# Patient Record
Sex: FEMALE | Race: WHITE | NOT HISPANIC OR LATINO | Employment: STUDENT | URBAN - METROPOLITAN AREA
[De-identification: names, ages, dates, MRNs, and addresses within clinical notes are randomized per-mention and may not be internally consistent; named-entity substitution may affect disease eponyms.]

---

## 2017-09-12 ENCOUNTER — TELEPHONE (OUTPATIENT)
Dept: OPHTHALMOLOGY | Facility: CLINIC | Age: 27
End: 2017-09-12

## 2017-09-12 NOTE — TELEPHONE ENCOUNTER
Called patient to confirm appointment with Dr. Barba on Monday 09/18 @ 7:30.  Asked that she phone back if she has any recent labs or scans that I should obtain.  EMG in [facility] from 1-22-15.  Ruma Noble RN 10:00 AM 09/12/17

## 2017-09-18 ENCOUNTER — OFFICE VISIT (OUTPATIENT)
Dept: OPHTHALMOLOGY | Facility: CLINIC | Age: 27
End: 2017-09-18

## 2017-09-18 VITALS — HEIGHT: 64 IN | BODY MASS INDEX: 23.56 KG/M2 | WEIGHT: 138 LBS

## 2017-09-18 DIAGNOSIS — H02.421 MYOGENIC PTOSIS OF RIGHT EYELID: Primary | ICD-10-CM

## 2017-09-18 ASSESSMENT — SLIT LAMP EXAM - LIDS
COMMENTS: PTOSIS
COMMENTS: PTOSIS

## 2017-09-18 ASSESSMENT — CONF VISUAL FIELD
METHOD: COUNTING FINGERS
OS_NORMAL: 1
OD_NORMAL: 1

## 2017-09-18 ASSESSMENT — VISUAL ACUITY
OS_CC: 20/20
CORRECTION_TYPE: GLASSES
METHOD: SNELLEN - LINEAR
OD_CC: 20/20

## 2017-09-18 ASSESSMENT — TONOMETRY
OD_IOP_MMHG: 17
IOP_METHOD: ICARE
OS_IOP_MMHG: 16

## 2017-09-18 ASSESSMENT — MARGIN REFLEX DISTANCE
OS_MRD1: 3
OD_MRD1: 1.5

## 2017-09-18 NOTE — NURSING NOTE
Chief Complaints and History of Present Illnesses   Patient presents with     Consult For     Ptosis     HPI    Affected eye(s):  Right   Symptoms:     No blurred vision      Frequency:  Constant       Do you have eye pain now?:  No      Comments:  Pt states has had a droopy lid in the right eye- has gotten worse throughout the last 5 years. Pt feels astigmatism has gotten worse and contacts are difficult to wear in the right eye. No pain.     Homar Wagner COT 7:31 AM September 18, 2017

## 2017-09-18 NOTE — MR AVS SNAPSHOT
"              After Visit Summary   9/18/2017    Yuko Arambula    MRN: [id number]           Patient Information     Date Of Birth          1990        Visit Information        Provider Department      9/18/2017 7:30 AM Leo Barba MD [facility] Ophthalmology        Today's Diagnoses     Myogenic ptosis of right eyelid    -  1      Care Instructions    Ptosis (Drooping Eyelids)    Eyelid ptosis (pronounced \"kindra-sis\") is a condition in which the upper eyelid droops or sags. It can affect one or both eyes. Sometimes the eyelid droops enough to obstruct the upper field of vision and/or side vision, requiring correction.??Ptosis Repair is a surgical procedure that can correct drooping eyelid(s). Depending upon the degree and cause, repair involves either resection (shortening) of a muscle in the eyelid or suspension with a muscle of the brow. Typically, the levator muscle (the major muscle responsible for elevating the upper eyelid) is shortened though an incision made along the natural crease of the lid. Excess skin weighing down the eyelid may also be removed.     Congenital Ptosis  Present from birth, the most common cause of congenital ptosis is the improper development of the levator muscle. Children may need tilt their head back or lift their eyelid with a finger to see. They may also develop amblyopia (\"lazy eye\"), strabismus (eyes that are not properly aligned), astigmatism, or blurred vision. Repair for mild to moderate congenital ptosis is generally performed between ages 3 and 5. Severe visual obstruction may require earlier treatment. ??Repair is usually performed in an outpatient surgical facility under general anesthesia so the child will not become anxious or restless during the procedure.     Acquired Ptosis  Most commonly due to age-related weakening of the levator muscle, acquired ptosis may also be caused by injury, trauma, or procedures, such as cataract surgery, which can cause weak " "tendons to stretch. Acquired ptosis may also be the first sign of some diseases, such as myasthenia gravis (a disorder in which the muscles become weak), or Debbi's syndrome (a neurological condition that indicates injury to part of the sympathetic nervous system).? Ptosis Repair is usually performed in an outpatient surgical facility under anesthesia that induces a \"twilight\" state. Sedated consciousness is preferred so that Dr. Barba can accurately adjust the eyelids.     Who Should Perform The Surgery?   When choosing a surgeon to perform ptosis surgery, look for a cosmetic and reconstructive surgeon who specializes in the eyelids, orbit, and tear drain system. Dr. Barba's membership in the American Society of Ophthalmic Plastic and Reconstructive Surgery (ASOPRS) indicates he or she is not only a board certified ophthalmologist who knows the anatomy and structure of the eyelids and orbit, but also has had extensive training in ophthalmic plastic reconstructive and cosmetic surgery.            Follow-ups after your visit        Your next 10 appointments already scheduled     Dec 20, 2017   Procedure with Leo Barba MD   [facility] ([facility])    [address] [place] [address]  [address] [place] [id number] [phone]           Located in the Clinics and Surgery Center at [address] [place] [address].   parking is very convenient and highly recommended.  is a $6 flat rate fee.  Both  and self parkers should enter the main arrival plaza from [facility]; parking attendants will direct you based on your parking preference.            Jan 08, 2018  8:15 AM CST   (Arrive by 8:00 AM)   Post-Op with Leo Barba MD   [facility] ([facility])    [address] [place] [id number] [phone]              Who to contact     Please call your clinic at [phone] " "to:    Ask questions about your health    Make or cancel appointments    Discuss your medicines    Learn about your test results    Speak to your doctor   If you have compliments or concerns about an experience at your clinic, or if you wish to file a complaint, please contact [facility] Patient Relations at [phone] or email us at [email or web address]         Additional Information About Your Visit        [employer] Information     [employer] gives you secure access to your electronic health record. If you see a primary care provider, you can also send messages to your care team and make appointments. If you have questions, please call your primary care clinic.  If you do not have a primary care provider, please call [phone] and they will assist you.      [employer] is an electronic gateway that provides easy, online access to your medical records. With [employer], you can request a clinic appointment, read your test results, renew a prescription or communicate with your care team.     To access your existing account, please contact your [facility] Clinic or call [phone] for assistance.        Care EveryWhere ID     This is your Care EveryWhere ID. This could be used by other organizations to access your [place] medical records  [id number]        Your Vitals Were     Height BMI (Body Mass Index)                1.626 m (5' 4\") 23.69 kg/m2           Blood Pressure from Last 3 Encounters:   01/28/15 117/75   01/19/15 114/72   12/13/14 119/73    Weight from Last 3 Encounters:   09/18/17 62.6 kg (138 lb)   01/28/15 68 kg (150 lb)   01/19/15 71.2 kg (157 lb)              We Performed the Following     External Photos OU (both eyes)     [place] VF Ptosis OD     Clarissa-Operative Worksheet (Plastics)        Primary Care Provider    Physician No Ref-Primary       No address on file        Equal Access to Services     DALE PITT: Dank delatorre " Sojaneen, wadelmerda luqadaha, qanickyta kamelissa florence, michael bui wanderkenyon laaurorarambo mariano. So [facility] [phone].    ATENCIÓN: Si yoseph orellana, tiene a alexis disposición servicios gratuitos de asistencia lingüística. Awilda al [phone].    We comply with applicable federal civil rights laws and [place] laws. We do not discriminate on the basis of race, color, national origin, age, disability sex, sexual orientation or gender identity.            Thank you!     Thank you for choosing [facility] OPHTHALMOLOGY  for your care. Our goal is always to provide you with excellent care. Hearing back from our patients is one way we can continue to improve our services. Please take a few minutes to complete the written survey that you may receive in the mail after your visit with us. Thank you!             Your Updated Medication List - Protect others around you: Learn how to safely use, store and throw away your medicines at www.disposemymeds.org.          This list is accurate as of: 9/18/17  8:25 AM.  Always use your most recent med list.                   Brand Name Dispense Instructions for use Diagnosis    albuterol 108 (90 BASE) MCG/ACT Inhaler    PROAIR HFA/PROVENTIL HFA/VENTOLIN HFA     Inhale 2 puffs into the lungs every 6 hours        loratadine 10 MG tablet    CLARITIN     Take 10 mg by mouth daily        LORAZEPAM PO           mometasone-formoterol 200-5 MCG/ACT oral inhaler    DULERA     Inhale 2 puffs into the lungs 2 times daily        norgestimate-ethinyl estradiol 0.25-35 MG-MCG per tablet    ORTHO-CYCLEN, SPRINTEC     Take 1 tablet by mouth daily

## 2017-09-18 NOTE — PROGRESS NOTES
Oculoplastic Clinic New Patient    Patient: Yuko Arambula MRN# [id number]   YOB: 1990 Age: 27 year old   Date of Visit: Sep 18, 2017    CC: Droopy eyelids obstructing vision.  Chief Complaints and History of Present Illnesses   Patient presents with     Consult For     Ptosis               HPI:     Yuko Arambula is a 27 year old female who has noted gradual onset of a droopy eyelid on the right side. The droopy eyelid is bothersome to her vision on the right.  She has developed a new astigmatism as well, which she attributes to the drooping. The patient denies double vision, variability of the eyelid position, or dry eye symptoms. She underwent a single fiber EMG, which was negative for myasthenia.  No prior trauma or other injury.  Has slowly gotten worse over years.    EXAM:     MRD1: 1.5 right, 3left  Excellent response to phenylephrine without significant Herring  Right eye dominant    VISUAL FIELD:  Right eye untaped:15 degrees Right eye taped:55 degrees    Assessment & Plan     Yuko Arambula is a 27 year old female with the following diagnoses:   1. Myogenic ptosis of right eyelid         Right ptosis repair MMCR 9.mm    ANTICOAGULATION: none        PHOTOS DEMONSTRATE:  Ptosis on the right    Kelsi Harvey MD  Ophthalmic Plastic and Reconstructive Surgery Fellow    Attending Physician Attestation:  I have seen and examined this patient .  I have confirmed and edited as necessary the chief complaint(s), history of present illness, review of systems, relevant history, and examination findings as documented by others.  I have personally reviewed the relevant tests, images, and reports as documented above.  I have confirmed and edited as necessary the assessment and plan and agree with this note.    - Leo Barba MD 8:14 AM 9/18/2017    Photographs were obtained to document the findings recorded under exam. These will be stored for future reference and comparison. The plan is  documented under assessment and plan above.   - Leo Barba MD 8:14 AM 9/18/2017    Today with Yuko Arambula, I reviewed the indications, risks, benefits, and alternatives of the proposed surgical procedure including, but not limited to, failure obtain the desired result  and need for additional surgery, bleeding, infection, loss of vision, loss of the eye, and the remote possibility of permanent damage to any organ system or death with the use of anesthesia.  I provided multiple opportunities for the questions, answered all questions to the best of my ability, and confirmed that my answers and my discussion were understood.   - Leo Barba MD 8:15 AM 9/18/2017

## 2017-09-18 NOTE — PATIENT INSTRUCTIONS
"Ptosis (Drooping Eyelids)    Eyelid ptosis (pronounced \"kindra-sis\") is a condition in which the upper eyelid droops or sags. It can affect one or both eyes. Sometimes the eyelid droops enough to obstruct the upper field of vision and/or side vision, requiring correction.??Ptosis Repair is a surgical procedure that can correct drooping eyelid(s). Depending upon the degree and cause, repair involves either resection (shortening) of a muscle in the eyelid or suspension with a muscle of the brow. Typically, the levator muscle (the major muscle responsible for elevating the upper eyelid) is shortened though an incision made along the natural crease of the lid. Excess skin weighing down the eyelid may also be removed.     Congenital Ptosis  Present from birth, the most common cause of congenital ptosis is the improper development of the levator muscle. Children may need tilt their head back or lift their eyelid with a finger to see. They may also develop amblyopia (\"lazy eye\"), strabismus (eyes that are not properly aligned), astigmatism, or blurred vision. Repair for mild to moderate congenital ptosis is generally performed between ages 3 and 5. Severe visual obstruction may require earlier treatment. ??Repair is usually performed in an outpatient surgical facility under general anesthesia so the child will not become anxious or restless during the procedure.     Acquired Ptosis  Most commonly due to age-related weakening of the levator muscle, acquired ptosis may also be caused by injury, trauma, or procedures, such as cataract surgery, which can cause weak tendons to stretch. Acquired ptosis may also be the first sign of some diseases, such as myasthenia gravis (a disorder in which the muscles become weak), or Debbi's syndrome (a neurological condition that indicates injury to part of the sympathetic nervous system).? Ptosis Repair is usually performed in an outpatient surgical facility under anesthesia that induces a " "\"twilight\" state. Sedated consciousness is preferred so that Dr. Barba can accurately adjust the eyelids.     Who Should Perform The Surgery?   When choosing a surgeon to perform ptosis surgery, look for a cosmetic and reconstructive surgeon who specializes in the eyelids, orbit, and tear drain system. Dr. Barba's membership in the American Society of Ophthalmic Plastic and Reconstructive Surgery (ASOPRS) indicates he or she is not only a board certified ophthalmologist who knows the anatomy and structure of the eyelids and orbit, but also has had extensive training in ophthalmic plastic reconstructive and cosmetic surgery.    "

## 2017-09-18 NOTE — LETTER
2017         RE:  :  MRN: Yuko Arambula  1990  [id number]     Dear Cody,    Thank you for asking me to see your patient, Yuko Arambula, for an oculoplastic   consultation.  My assessment and plan are below.  For further details, please see my attached clinic note.           HPI:     Yuko Arambula is a 27 year old female who has noted gradual onset of a droopy eyelid on the right side. The droopy eyelid is bothersome to her vision on the right.  She has developed a new astigmatism as well, which she attributes to the drooping. The patient denies double vision, variability of the eyelid position, or dry eye symptoms. She underwent a single fiber EMG, which was negative for myasthenia.  No prior trauma or other injury.  Has slowly gotten worse over years.    EXAM:   MRD1: 1.5 right, 3left  Excellent response to phenylephrine without significant Herring  Right eye dominant    VISUAL FIELD:  Right eye untaped:15 degrees Right eye taped:55 degrees    Assessment & Plan     Yuko Arambula is a 27 year old female with the following diagnoses:   1. Myogenic ptosis of right eyelid     -likely contact lens related    Right ptosis repair by Raines's muscle conjunctival resection       Again, thank you for allowing me to participate in the care of your patient.      Sincerely,    Leo Barba MD  Department of Ophthalmology and Visual Neurosciences  [facility]    CC: [facility]  [address] [address]  VIA Facsimile: [phone]     Cody Sifuentes MD  [address] [facility] [address] [facility] [facility] [id number]  [facility] [address]  VIA In Basket

## 2017-12-19 ENCOUNTER — ANESTHESIA EVENT (OUTPATIENT)
Dept: SURGERY | Facility: AMBULATORY SURGERY CENTER | Age: 27
End: 2017-12-19

## 2017-12-20 ENCOUNTER — ANESTHESIA (OUTPATIENT)
Dept: SURGERY | Facility: AMBULATORY SURGERY CENTER | Age: 27
End: 2017-12-20

## 2017-12-20 ENCOUNTER — HOSPITAL ENCOUNTER (OUTPATIENT)
Facility: AMBULATORY SURGERY CENTER | Age: 27
End: 2017-12-20
Attending: OPHTHALMOLOGY
Payer: COMMERCIAL

## 2017-12-20 ENCOUNTER — SURGERY (OUTPATIENT)
Age: 27
End: 2017-12-20

## 2017-12-20 VITALS
RESPIRATION RATE: 11 BRPM | OXYGEN SATURATION: 96 % | HEIGHT: 64 IN | SYSTOLIC BLOOD PRESSURE: 107 MMHG | DIASTOLIC BLOOD PRESSURE: 62 MMHG | BODY MASS INDEX: 22.2 KG/M2 | TEMPERATURE: 98 F | HEART RATE: 77 BPM | WEIGHT: 130 LBS

## 2017-12-20 DIAGNOSIS — Z98.890 POSTOPERATIVE EYE STATE: Primary | ICD-10-CM

## 2017-12-20 LAB
HCG UR QL: NEGATIVE
INTERNAL QC OK POCT: YES

## 2017-12-20 RX ORDER — LIDOCAINE 40 MG/G
CREAM TOPICAL
Status: DISCONTINUED | OUTPATIENT
Start: 2017-12-20 | End: 2017-12-20 | Stop reason: HOSPADM

## 2017-12-20 RX ORDER — MEPERIDINE HYDROCHLORIDE 25 MG/ML
12.5 INJECTION INTRAMUSCULAR; INTRAVENOUS; SUBCUTANEOUS
Status: DISCONTINUED | OUTPATIENT
Start: 2017-12-20 | End: 2017-12-21 | Stop reason: HOSPADM

## 2017-12-20 RX ORDER — ACETAMINOPHEN 325 MG/1
975 TABLET ORAL ONCE
Status: COMPLETED | OUTPATIENT
Start: 2017-12-20 | End: 2017-12-20

## 2017-12-20 RX ORDER — PROPOFOL 10 MG/ML
INJECTION, EMULSION INTRAVENOUS CONTINUOUS PRN
Status: DISCONTINUED | OUTPATIENT
Start: 2017-12-20 | End: 2017-12-20

## 2017-12-20 RX ORDER — ONDANSETRON 4 MG/1
4 TABLET, ORALLY DISINTEGRATING ORAL EVERY 30 MIN PRN
Status: DISCONTINUED | OUTPATIENT
Start: 2017-12-20 | End: 2017-12-21 | Stop reason: HOSPADM

## 2017-12-20 RX ORDER — TETRACAINE HYDROCHLORIDE 5 MG/ML
SOLUTION OPHTHALMIC PRN
Status: DISCONTINUED | OUTPATIENT
Start: 2017-12-20 | End: 2017-12-20 | Stop reason: HOSPADM

## 2017-12-20 RX ORDER — LIDOCAINE HYDROCHLORIDE AND EPINEPHRINE 10; 10 MG/ML; UG/ML
INJECTION, SOLUTION INFILTRATION; PERINEURAL PRN
Status: DISCONTINUED | OUTPATIENT
Start: 2017-12-20 | End: 2017-12-20 | Stop reason: HOSPADM

## 2017-12-20 RX ORDER — SODIUM CHLORIDE, SODIUM LACTATE, POTASSIUM CHLORIDE, CALCIUM CHLORIDE 600; 310; 30; 20 MG/100ML; MG/100ML; MG/100ML; MG/100ML
INJECTION, SOLUTION INTRAVENOUS CONTINUOUS
Status: DISCONTINUED | OUTPATIENT
Start: 2017-12-20 | End: 2017-12-21 | Stop reason: HOSPADM

## 2017-12-20 RX ORDER — NALOXONE HYDROCHLORIDE 0.4 MG/ML
.1-.4 INJECTION, SOLUTION INTRAMUSCULAR; INTRAVENOUS; SUBCUTANEOUS
Status: DISCONTINUED | OUTPATIENT
Start: 2017-12-20 | End: 2017-12-21 | Stop reason: HOSPADM

## 2017-12-20 RX ORDER — NEOMYCIN SULFATE, POLYMYXIN B SULFATE AND DEXAMETHASONE 3.5; 10000; 1 MG/ML; [USP'U]/ML; MG/ML
1 SUSPENSION/ DROPS OPHTHALMIC 4 TIMES DAILY
Qty: 1 BOTTLE | Refills: 0 | Status: SHIPPED | OUTPATIENT
Start: 2017-12-20 | End: 2019-07-19

## 2017-12-20 RX ORDER — FENTANYL CITRATE 50 UG/ML
25-50 INJECTION, SOLUTION INTRAMUSCULAR; INTRAVENOUS
Status: DISCONTINUED | OUTPATIENT
Start: 2017-12-20 | End: 2017-12-20 | Stop reason: HOSPADM

## 2017-12-20 RX ORDER — ERYTHROMYCIN 5 MG/G
OINTMENT OPHTHALMIC PRN
Status: DISCONTINUED | OUTPATIENT
Start: 2017-12-20 | End: 2017-12-20 | Stop reason: HOSPADM

## 2017-12-20 RX ORDER — FENTANYL CITRATE 50 UG/ML
INJECTION, SOLUTION INTRAMUSCULAR; INTRAVENOUS PRN
Status: DISCONTINUED | OUTPATIENT
Start: 2017-12-20 | End: 2017-12-20

## 2017-12-20 RX ORDER — GABAPENTIN 300 MG/1
300 CAPSULE ORAL ONCE
Status: COMPLETED | OUTPATIENT
Start: 2017-12-20 | End: 2017-12-20

## 2017-12-20 RX ORDER — SODIUM CHLORIDE, SODIUM LACTATE, POTASSIUM CHLORIDE, CALCIUM CHLORIDE 600; 310; 30; 20 MG/100ML; MG/100ML; MG/100ML; MG/100ML
INJECTION, SOLUTION INTRAVENOUS CONTINUOUS
Status: DISCONTINUED | OUTPATIENT
Start: 2017-12-20 | End: 2017-12-20 | Stop reason: HOSPADM

## 2017-12-20 RX ORDER — ONDANSETRON 2 MG/ML
4 INJECTION INTRAMUSCULAR; INTRAVENOUS EVERY 30 MIN PRN
Status: DISCONTINUED | OUTPATIENT
Start: 2017-12-20 | End: 2017-12-21 | Stop reason: HOSPADM

## 2017-12-20 RX ORDER — HYDROCODONE BITARTRATE AND ACETAMINOPHEN 5; 325 MG/1; MG/1
1 TABLET ORAL EVERY 6 HOURS PRN
Qty: 10 TABLET | Refills: 0 | Status: SHIPPED | OUTPATIENT
Start: 2017-12-20 | End: 2019-07-19

## 2017-12-20 RX ORDER — PROPOFOL 10 MG/ML
INJECTION, EMULSION INTRAVENOUS PRN
Status: DISCONTINUED | OUTPATIENT
Start: 2017-12-20 | End: 2017-12-20

## 2017-12-20 RX ORDER — ONDANSETRON 2 MG/ML
INJECTION INTRAMUSCULAR; INTRAVENOUS PRN
Status: DISCONTINUED | OUTPATIENT
Start: 2017-12-20 | End: 2017-12-20

## 2017-12-20 RX ADMIN — PROPOFOL 50 MG: 10 INJECTION, EMULSION INTRAVENOUS at 09:57

## 2017-12-20 RX ADMIN — ERYTHROMYCIN 1 INCH: 5 OINTMENT OPHTHALMIC at 10:06

## 2017-12-20 RX ADMIN — SODIUM CHLORIDE, SODIUM LACTATE, POTASSIUM CHLORIDE, CALCIUM CHLORIDE: 600; 310; 30; 20 INJECTION, SOLUTION INTRAVENOUS at 08:30

## 2017-12-20 RX ADMIN — GABAPENTIN 300 MG: 300 CAPSULE ORAL at 08:30

## 2017-12-20 RX ADMIN — PROPOFOL 50 MG: 10 INJECTION, EMULSION INTRAVENOUS at 09:53

## 2017-12-20 RX ADMIN — FENTANYL CITRATE 100 MCG: 50 INJECTION, SOLUTION INTRAMUSCULAR; INTRAVENOUS at 09:50

## 2017-12-20 RX ADMIN — PROPOFOL 150 MCG/KG/MIN: 10 INJECTION, EMULSION INTRAVENOUS at 09:53

## 2017-12-20 RX ADMIN — TETRACAINE HYDROCHLORIDE 2 DROP: 5 SOLUTION OPHTHALMIC at 09:52

## 2017-12-20 RX ADMIN — PROPOFOL 20 MG: 10 INJECTION, EMULSION INTRAVENOUS at 09:55

## 2017-12-20 RX ADMIN — ONDANSETRON 4 MG: 2 INJECTION INTRAMUSCULAR; INTRAVENOUS at 09:55

## 2017-12-20 RX ADMIN — LIDOCAINE HYDROCHLORIDE AND EPINEPHRINE 3 ML: 10; 10 INJECTION, SOLUTION INFILTRATION; PERINEURAL at 10:05

## 2017-12-20 RX ADMIN — ACETAMINOPHEN 975 MG: 325 TABLET ORAL at 08:29

## 2017-12-20 NOTE — DISCHARGE INSTRUCTIONS
Post-operative Instructions    Ophthalmic Plastic and Reconstructive Surgery  Leo Barba M.D.  Rosendo Conklin M.D.  Kelsi Harvey M.D.    All instructions apply to the operated eye(s) or eyelid(s)      What to expect after surgery:    Thre will be some swelling, bruising, and likely a black eye (even into the lower eyelids and cheeks). Also expect crusting and discharge from the eye and/or incisions.     A small amount of surface bleeding is normal for the first 48 hours after surgery.    You may notice some bloody tears for the first few days after surgery. This is normal.    Your eye(s) and eyelid(s) may be painful and tender. This is normal after surgery. Use the pain medication as prescribed. If your pain does not improve despite the medication, contact the office.    Wound care and personal care:    If a patch or bandage has been placed, please leave this in place until seen in clinic. Prevent the bandage from getting wet.     Apply ice compresses 15 minutes on 15 minutes off while awake for the first 2 days after surgery, then switch to warm compresses 4 times a day until seen by your physician.     For warm packs you can place a cup of dry uncooked rice in a clean cotton sock. Place sock in microwave 30 seconds to one minute. Next place the warm sock into a plastic bag and wrap the bag with clean warm wet washcloth and place over operated eye.      You may shower or wash your hair the day after surgery. Do not bathe or go swimming for 1 week to prevent contamination of your wounds.    Do not apply make-up to the eyes or eyelids for 2 weeks after surgery.      Activity restrictions and driving:    Avoid heavy lifting, bending, exercise or strenuous activity for 1 week after surgery.    You may resume other activities and return to work as tolerated.    You may not resume driving until have you stopped using narcotic pain medications(such as Norco, Percocet, Tylenol #3).    Medications:    Restart  all your regular home medications and eye drops today. If you take Plavix or Aspirin on a regular basis, wait for 3 days after your surgery before restarting these in order to decrease the risk of bleeding complications.    Avoid aspirin and aspirin-like medications (Motrin, Aleve, Ibuprofen, Tasha-White Swan etc) for 5 days to reduce the risk of bleeding. You may take Tylenol (acetaminophen) for pain.    In addition to your home medications, take the following post-operative medications as prescribed by your physician:    Apply antibiotic ointment (erythromycin) to all sutures three times a day, and into the operated eye(s) at night.     Instill eye drops (Maxitrol) four times a day until the bottle finished.     Take 1 to 2 pain pills (norco or tylenol 3 as prescribed) as needed for pain up to every 4 hours.    The pain pills may make you drowsy. You must not drive a car, operate heavy machinery or drink alcohol while taking them.    The pain pills may cause constipation and nausea. Take them with some food to prevent a stomach upset. If you continue to experience nausea, call your physician.      WARNING: All the prescription pain medications listed above contain Tylenol (acetaminophen). You must not take more than 4,000 mg of acetaminophen per 24-hour period. This is equivalent to 6 tablets of Darvocet, 8 tablets of Vicodin, or 12 tablets of Norco, Percocet or Tylenol #3. If you take other over-the-counter medications containing acetaminophen, you must take the amount of acetaminophen into account and reduce the number of prescribed pain pills accordingly.    Contact information and follow-up:    Return to the Eye Clinic for a follow-up appointment with your physician as  scheduled. If no appointment has been scheduled, call [phone] for an  appointment with Dr. Barba within 1 to 2 weeks from your date of surgery.      For severe pain, bleeding, or loss of vision, call the Eye Clinic at [phone].    After  hours or on weekends and holidays, call [phone] and ask to speak with the ophthalmologist on call.

## 2017-12-20 NOTE — ANESTHESIA PREPROCEDURE EVALUATION
Anesthesia Evaluation     . Pt has had prior anesthetic.     No history of anesthetic complications          ROS/MED HX    ENT/Pulmonary:       Neurologic:       Cardiovascular:  - neg cardiovascular ROS       METS/Exercise Tolerance:     Hematologic:         Musculoskeletal:         GI/Hepatic:  - neg GI/hepatic ROS       Renal/Genitourinary:         Endo:         Psychiatric:     (+) psychiatric history depression and anxiety      Infectious Disease:         Malignancy:         Other:                     Physical Exam  Normal systems: dental    Airway   Mallampati: II  TM distance: >3 FB    Dental     Cardiovascular   Rhythm and rate: regular      Pulmonary    breath sounds clear to auscultation                    Anesthesia Plan      History & Physical Review  History and physical reviewed and following examination; no interval change.    ASA Status:  2 .    NPO Status:  > 8 hours    Plan for MAC with Intravenous induction. Maintenance will be TIVA.    PONV prophylaxis:  Ondansetron (or other 5HT-3)       Postoperative Care  Postoperative pain management:  Oral pain medications and Multi-modal analgesia.      Consents  Anesthetic plan, risks, benefits and alternatives discussed with:  Patient..                          .

## 2017-12-20 NOTE — IP AVS SNAPSHOT
MRN:[id number]                      After Visit Summary   12/20/2017    Yuko Arambula    MRN: [id number]           Thank you!     Thank you for choosing [facility] for your care. Our goal is always to provide you with excellent care. Hearing back from our patients is one way we can continue to improve our services. Please take a few minutes to complete the written survey that you may receive in the mail after you visit with us. Thank you!        Patient Information     Date Of Birth          1990        About your hospital stay     You were admitted on:  December 20, 2017 You last received care in the:  [facility]    You were discharged on:  December 20, 2017       Who to Call     For medical emergencies, please call 911.  For non-urgent questions about your medical care, please call your primary care provider or clinic, None  For questions related to your surgery, please call your surgery clinic        Attending Provider     Provider Leo Pabon MD Ophthalmology       Primary Care Provider Fax #    Physician No Ref-Primary [phone]      After Care Instructions     Discharge Medication Instructions       Do NOT take aspirin or medications containing NSAIDS for 72 hours after procedure.            Ice to affected area       Apply cold pack for 15 minutes on, 15 minutes off, for 48 hours while awake.                  Your next 10 appointments already scheduled     Jan 08, 2018  8:15 AM CST   (Arrive by 8:00 AM)   Post-Op with Leo Barba MD   [facility] ([facility] [place])    [address] [place] [address] [phone]              Further instructions from your care team       Post-operative Instructions    Ophthalmic Plastic and Reconstructive Surgery  Leo Barba M.D.  Rosendo Conklin M.D.  Kelsi Harvey M.D.    All instructions apply to the operated eye(s) or  eyelid(s)      What to expect after surgery:    Thre will be some swelling, bruising, and likely a black eye (even into the lower eyelids and cheeks). Also expect crusting and discharge from the eye and/or incisions.     A small amount of surface bleeding is normal for the first 48 hours after surgery.    You may notice some bloody tears for the first few days after surgery. This is normal.    Your eye(s) and eyelid(s) may be painful and tender. This is normal after surgery. Use the pain medication as prescribed. If your pain does not improve despite the medication, contact the office.    Wound care and personal care:    If a patch or bandage has been placed, please leave this in place until seen in clinic. Prevent the bandage from getting wet.     Apply ice compresses 15 minutes on 15 minutes off while awake for the first 2 days after surgery, then switch to warm compresses 4 times a day until seen by your physician.     For warm packs you can place a cup of dry uncooked rice in a clean cotton sock. Place sock in microwave 30 seconds to one minute. Next place the warm sock into a plastic bag and wrap the bag with clean warm wet washcloth and place over operated eye.      You may shower or wash your hair the day after surgery. Do not bathe or go swimming for 1 week to prevent contamination of your wounds.    Do not apply make-up to the eyes or eyelids for 2 weeks after surgery.      Activity restrictions and driving:    Avoid heavy lifting, bending, exercise or strenuous activity for 1 week after surgery.    You may resume other activities and return to work as tolerated.    You may not resume driving until have you stopped using narcotic pain medications(such as Norco, Percocet, Tylenol #3).    Medications:    Restart all your regular home medications and eye drops today. If you take Plavix or Aspirin on a regular basis, wait for 3 days after your surgery before restarting these in order to decrease the risk of  bleeding complications.    Avoid aspirin and aspirin-like medications (Motrin, Aleve, Ibuprofen, T[place] etc) for 5 days to reduce the risk of bleeding. You may take Tylenol (acetaminophen) for pain.    In addition to your home medications, take the following post-operative medications as prescribed by your physician:    Apply antibiotic ointment (erythromycin) to all sutures three times a day, and into the operated eye(s) at night.     Instill eye drops (Maxitrol) four times a day until the bottle finished.     Take 1 to 2 pain pills (norco or tylenol 3 as prescribed) as needed for pain up to every 4 hours.    The pain pills may make you drowsy. You must not drive a car, operate heavy machinery or drink alcohol while taking them.    The pain pills may cause constipation and nausea. Take them with some food to prevent a stomach upset. If you continue to experience nausea, call your physician.      WARNING: All the prescription pain medications listed above contain Tylenol (acetaminophen). You must not take more than 4,000 mg of acetaminophen per 24-hour period. This is equivalent to 6 tablets of Darvocet, 8 tablets of Vicodin, or 12 tablets of Norco, Percocet or Tylenol #3. If you take other over-the-counter medications containing acetaminophen, you must take the amount of acetaminophen into account and reduce the number of prescribed pain pills accordingly.    Contact information and follow-up:    Return to the Eye Clinic for a follow-up appointment with your physician as  scheduled. If no appointment has been scheduled, call [phone] for an  appointment with Dr. Barba within 1 to 2 weeks from your date of surgery.      For severe pain, bleeding, or loss of vision, call the Eye Clinic at [phone].    After hours or on weekends and holidays, call [phone] and ask to speak with the ophthalmologist on call.      Pending Results     No orders found from 12/18/2017 to 12/21/2017.            Admission  "Information     Date & Time Provider Department Dept. Phone    12/20/2017 Leo Barba MD [facility] [phone]      Your Vitals Were     Blood Pressure Pulse Temperature Respirations Height Weight    111/69 92 98  F (36.7  C) (Oral) 10 1.626 m (5' 4\") 59 kg (130 lb)    Last Period Pulse Oximetry BMI (Body Mass Index)             11/19/2017 95% 22.31 kg/m2         Activity [employer] Information     [employer] gives you secure access to your electronic health record. If you see a primary care provider, you can also send messages to your care team and make appointments. If you have questions, please call your primary care clinic.  If you do not have a primary care provider, please call [phone] and they will assist you.      [employer] is an electronic gateway that provides easy, online access to your medical records. With [employer], you can request a clinic appointment, read your test results, renew a prescription or communicate with your care team.     To access your existing account, please contact your [facility] or call [phone] for assistance.        Care EveryWhere ID     This is your Care EveryWhere ID. This could be used by other organizations to access your [facility] medical records  [id number]        Equal Access to Services     DALE HARRISON : Hadii salud zhango Sojaneen, waaxda luqadaha, qaybta kaalmada adeegyada, michael quintana. So [facility] [phone].    ATENCIÓN: Si habla español, tiene a alexis disposición servicios gratuitos de asistencia lingüística. Llluis al [phone].    We comply with applicable federal civil rights laws and [place] laws. We do not discriminate on the basis of race, color, national origin, age, disability, sex, sexual orientation, or gender identity.               Review of your medicines      START taking        Dose / Directions    HYDROcodone-acetaminophen 5-325 MG per tablet   Commonly known as: "  NORCO   Used for:  Postoperative eye state        Dose:  1 tablet   Take 1 tablet by mouth every 6 hours as needed for pain Maximum of 4000 mg of acetaminophen in 24 hours.   Quantity:  10 tablet   Refills:  0       neomycin-polymyxin-dexamethasone 3.5-69483-4.1 Susp ophthalmic susp   Commonly known as:  MAXITROL   Used for:  Postoperative eye state        Dose:  1 drop   Place 1 drop into the right eye 4 times daily   Quantity:  1 Bottle   Refills:  0         CONTINUE these medicines which have NOT CHANGED        Dose / Directions    albuterol 108 (90 BASE) MCG/ACT Inhaler   Commonly known as:  PROAIR HFA/PROVENTIL HFA/VENTOLIN HFA        Dose:  2 puff   Inhale 2 puffs into the lungs every 6 hours   Refills:  0       loratadine 10 MG tablet   Commonly known as:  CLARITIN        Dose:  10 mg   Take 10 mg by mouth daily   Refills:  0       norgestimate-ethinyl estradiol 0.25-35 MG-MCG per tablet   Commonly known as:  ORTHO-CYCLEN, SPRINTEC        Dose:  1 tablet   Take 1 tablet by mouth daily   Refills:  0       XANAX PO        Dose:  0.5 mg   Take 0.5 mg by mouth once as needed for anxiety   Refills:  0            Where to get your medicines      These medications were sent to [address] [address]    Hours:  TRANSPLANT PHONE NUMBER [phone] Phone:  [phone]     neomycin-polymyxin-dexamethasone 3.5-00381-1.1 Susp ophthalmic susp         Some of these will need a paper prescription and others can be bought over the counter. Ask your nurse if you have questions.     Bring a paper prescription for each of these medications     HYDROcodone-acetaminophen 5-325 MG per tablet                Protect others around you: Learn how to safely use, store and throw away your medicines at www.disposemymeds.org.             Medication List: This is a list of all your medications and when to take them. Check marks  below indicate your daily home schedule. Keep this list as a reference.      Medications           Morning Afternoon Evening Bedtime As Needed    albuterol 108 (90 BASE) MCG/ACT Inhaler   Commonly known as:  PROAIR HFA/PROVENTIL HFA/VENTOLIN HFA   Inhale 2 puffs into the lungs every 6 hours                                HYDROcodone-acetaminophen 5-325 MG per tablet   Commonly known as:  NORCO   Take 1 tablet by mouth every 6 hours as needed for pain Maximum of 4000 mg of acetaminophen in 24 hours.                                loratadine 10 MG tablet   Commonly known as:  CLARITIN   Take 10 mg by mouth daily                                neomycin-polymyxin-dexamethasone 3.5-29541-1.1 Susp ophthalmic susp   Commonly known as:  MAXITROL   Place 1 drop into the right eye 4 times daily                                norgestimate-ethinyl estradiol 0.25-35 MG-MCG per tablet   Commonly known as:  ORTHO-CYCLEN, SPRINTEC   Take 1 tablet by mouth daily                                XANAX PO   Take 0.5 mg by mouth once as needed for anxiety

## 2017-12-20 NOTE — ANESTHESIA CARE TRANSFER NOTE
Patient: Yuko Arambula    Procedure(s):  Right Upper Lid Ptosis Repair - Wound Class: I-Clean    Diagnosis: Ptosis  Diagnosis Additional Information: No value filed.    Anesthesia Type:   MAC     Note:  Airway :Room Air  Patient transferred to:Phase II  Handoff Report: Identifed the Patient, Identified the Reponsible Provider, Reviewed the pertinent medical history, Discussed the surgical course, Reviewed Intra-OP anesthesia mangement and issues during anesthesia, Set expectations for post-procedure period and Allowed opportunity for questions and acknowledgement of understanding      Vitals: (Last set prior to Anesthesia Care Transfer)    CRNA VITALS  12/20/2017 0941 - 12/20/2017 1013      12/20/2017             Pulse: 95    Ht Rate: 95    SpO2: 98 %    Resp Rate (set): 10                Electronically Signed By: TIMMY Pina CRNA  December 20, 2017  10:13 AM

## 2017-12-20 NOTE — IP AVS SNAPSHOT
[facility]    [address] [place] [address]    Phone:  [phone]    Fax:  [phone]                                       After Visit Summary   12/20/2017    Yuko Arambula    MRN: [id number]           After Visit Summary Signature Page     I have received my discharge instructions, and my questions have been answered. I have discussed any challenges I see with this plan with the nurse or doctor.    ..........................................................................................................................................  Patient/Patient Representative Signature      ..........................................................................................................................................  Patient Representative Print Name and Relationship to Patient    ..................................................               ................................................  Date                                            Time    ..........................................................................................................................................  Reviewed by Signature/Title    ...................................................              ..............................................  Date                                                            Time

## 2017-12-20 NOTE — ANESTHESIA POSTPROCEDURE EVALUATION
Patient: Yuko Arambula    Procedure(s):  Right Upper Lid Ptosis Repair - Wound Class: I-Clean    Diagnosis:Ptosis  Diagnosis Additional Information: No value filed.    Anesthesia Type:  MAC    Note:  Anesthesia Post Evaluation    Patient location during evaluation: PACU  Patient participation: Able to fully participate in evaluation  Level of consciousness: awake and alert  Pain management: adequate  Airway patency: patent  Cardiovascular status: hemodynamically stable  Respiratory status: nonlabored ventilation, spontaneous ventilation and room air  Hydration status: euvolemic  PONV: none     Anesthetic complications: None          Last vitals:  Vitals:    12/20/17 1018 12/20/17 1030 12/20/17 1040   BP: 111/69 106/68 107/62   Pulse: 92 86 77   Resp: 10 12 11   Temp: 36.7  C (98  F)     SpO2: 95% 95% 96%         Electronically Signed By: Paolo Delvalle MD  December 20, 2017  12:57 PM

## 2017-12-21 ENCOUNTER — TELEPHONE (OUTPATIENT)
Dept: OPHTHALMOLOGY | Facility: CLINIC | Age: 27
End: 2017-12-21

## 2017-12-21 NOTE — TELEPHONE ENCOUNTER
POD1  A telephone call was made to Yuko Arambula to make sure the post operative course has been uneventful and that all questions were answered. There was no answer and a telephone message was left.    Kelsi Harvey

## 2017-12-24 ENCOUNTER — HEALTH MAINTENANCE LETTER (OUTPATIENT)
Age: 27
End: 2017-12-24

## 2018-01-08 ENCOUNTER — OFFICE VISIT (OUTPATIENT)
Dept: OPHTHALMOLOGY | Facility: CLINIC | Age: 28
End: 2018-01-08
Payer: COMMERCIAL

## 2018-01-08 DIAGNOSIS — Z98.890 POSTOPERATIVE EYE STATE: Primary | ICD-10-CM

## 2018-01-08 DIAGNOSIS — H02.421 MYOGENIC PTOSIS OF RIGHT EYELID: ICD-10-CM

## 2018-01-08 RX ORDER — LAMOTRIGINE 100 MG/1
100 TABLET ORAL DAILY
COMMUNITY
End: 2019-07-19

## 2018-01-08 ASSESSMENT — VISUAL ACUITY
OD_CC+: +2
OD_CC: 20/20
OS_CC+: -
OS_CC: 20/20
METHOD: SNELLEN - LINEAR
CORRECTION_TYPE: GLASSES

## 2018-01-08 ASSESSMENT — SLIT LAMP EXAM - LIDS
COMMENTS: NORMAL
COMMENTS: LID IN GOOD POSITION

## 2018-01-08 ASSESSMENT — TONOMETRY
IOP_METHOD: ICARE
OD_IOP_MMHG: 18
OS_IOP_MMHG: 14

## 2018-01-08 NOTE — MR AVS SNAPSHOT
After Visit Summary   1/8/2018    Yuko Arambula    MRN: [id number]           Patient Information     Date Of Birth          1990        Visit Information        Provider Department      1/8/2018 1:00 PM Leo Barba MD [facility]        Today's Diagnoses     Postoperative eye state    -  1    Myogenic ptosis of right eyelid           Follow-ups after your visit        Follow-up notes from your care team     Return in about 2 months (around 3/8/2018).      Your next 10 appointments already scheduled     Mar 12, 2018  1:15 PM CDT   (Arrive by 1:00 PM)   Post-Op with Leo Barba MD   [facility] ([facility] [place])    [address]  [address] [facility] [address] [phone]              Who to contact     Please call your clinic at [phone] to:    Ask questions about your health    Make or cancel appointments    Discuss your medicines    Learn about your test results    Speak to your doctor   If you have compliments or concerns about an experience at your clinic, or if you wish to file a complaint, please contact [facility] Patient Relations at [phone] or email us at [email or web address]         Additional Information About Your Visit        MyChart Information     [employer] gives you secure access to your electronic health record. If you see a primary care provider, you can also send messages to your care team and make appointments. If you have questions, please call your primary care clinic.  If you do not have a primary care provider, please call [phone] and they will assist you.      [employer] is an electronic gateway that provides easy, online access to your medical records. With [employer], you can request a clinic appointment, read your test results, renew a prescription or communicate with your care team.     To access your existing account, please contact your [facility]  [place] [facility] or call [phone] for assistance.        Care EveryWhere ID     This is your Care EveryWhere ID. This could be used by other organizations to access your [place] medical records  [id number]        Your Vitals Were     Last Period                   11/19/2017            Blood Pressure from Last 3 Encounters:   12/20/17 107/62   01/28/15 117/75   01/19/15 114/72    Weight from Last 3 Encounters:   12/20/17 59 kg (130 lb)   09/18/17 62.6 kg (138 lb)   01/28/15 68 kg (150 lb)              Today, you had the following     No orders found for display       Primary Care Provider Fax #    Physician No Ref-Primary [phone]       No address on file        Equal Access to Services     DALE HARRISON : Hadii salud zhango [place], waaxda luqadaha, qaybta kaalmada adeegyada, michael preciado . So [facility] [phone].    ATENCIÓN: Si habla español, tiene a alexis disposición servicios gratuitos de asistencia lingüística. Llame al [phone].    We comply with applicable federal civil rights laws and [place] laws. We do not discriminate on the basis of race, color, national origin, age, disability, sex, sexual orientation, or gender identity.            Thank you!     Thank you for choosing [facility]  for your care. Our goal is always to provide you with excellent care. Hearing back from our patients is one way we can continue to improve our services. Please take a few minutes to complete the written survey that you may receive in the mail after your visit with us. Thank you!             Your Updated Medication List - Protect others around you: Learn how to safely use, store and throw away your medicines at www.disposemymeds.org.          This list is accurate as of: 1/8/18  3:37 PM.  Always use your most recent med list.                   Brand Name Dispense Instructions for use Diagnosis    albuterol 108 (90 BASE) MCG/ACT Inhaler    PROAIR HFA/PROVENTIL  HFA/VENTOLIN HFA     Inhale 2 puffs into the lungs every 6 hours        HYDROcodone-acetaminophen 5-325 MG per tablet    NORCO    10 tablet    Take 1 tablet by mouth every 6 hours as needed for pain Maximum of 4000 mg of acetaminophen in 24 hours.    Postoperative eye state       lamoTRIgine 100 MG tablet    LaMICtal     Take 100 mg by mouth daily        loratadine 10 MG tablet    CLARITIN     Take 10 mg by mouth daily        neomycin-polymyxin-dexamethasone 3.5-53129-3.1 Susp ophthalmic susp    MAXITROL    1 Bottle    Place 1 drop into the right eye 4 times daily    Postoperative eye state       norgestimate-ethinyl estradiol 0.25-35 MG-MCG per tablet    ORTHO-CYCLEN, SPRINTEC     Take 1 tablet by mouth daily        XANAX PO      Take 0.5 mg by mouth once as needed for anxiety

## 2018-01-08 NOTE — NURSING NOTE
Chief Complaints and History of Present Illnesses   Patient presents with     Post Op (Ophthalmology) Right Eye     Right Upper Lid Ptosis Repair     HPI    Affected eye(s):  Right   Symptoms:     No blurred vision   Itching      Frequency:  Constant       Do you have eye pain now?:  No      Comments:  S/p Right Upper Lid Ptosis Repair 12/20/17. Right eye is itchy. Trying not to itch it but when she does it gets a little inflamed. No eye pain. Using drop QID and it's burning now. Finished with ointment. No vision changes but can now see superiorly and temporally OD much better.    Susan Avila COT 1:05 PM January 8, 2018

## 2018-01-08 NOTE — PROGRESS NOTES
Doing well postop right upper eyelid MMCR. Happy with outcome.  - continue warm packs  - stop drops  Return to clinic in 2 months.    Kelsi Harvey MD  Ophthalmic Plastic and Reconstructive Surgery Fellow      Attending Physician Attestation:  I did not see this patient on Jan 8, 2018, but I have reviewed the relevant history, examination findings, assessment, and plan as documented by others.  I have confirmed and edited as necessary the assessment and plan and agree with this note.  - Leo Barba., MD 1:41 PM 1/8/2018

## 2018-01-26 DIAGNOSIS — H02.422 MYOGENIC PTOSIS OF LEFT EYELID: Primary | ICD-10-CM

## 2018-01-30 ENCOUNTER — TELEPHONE (OUTPATIENT)
Dept: OPHTHALMOLOGY | Facility: CLINIC | Age: 28
End: 2018-01-30

## 2018-01-30 NOTE — TELEPHONE ENCOUNTER
Called patient to schedule surgery with Dr. Barba.   There was no answer, left voicemail with Date of 3/14 and patient called back to confirm date.      Surgery was scheduled on 03/14.    Patient will have H&P at PCP  Post-Op care appointment was scheduled on 03/30  Patient is aware a / is needed day of surgery.   Surgery packet was mailed, she has my direct contact information for any further questions.

## 2018-02-19 ENCOUNTER — OFFICE VISIT (OUTPATIENT)
Dept: OPHTHALMOLOGY | Facility: CLINIC | Age: 28
End: 2018-02-19
Payer: COMMERCIAL

## 2018-02-19 DIAGNOSIS — H02.422 MYOGENIC PTOSIS OF LEFT EYELID: Primary | ICD-10-CM

## 2018-02-19 ASSESSMENT — MARGIN REFLEX DISTANCE
OS_MRD1: 2
OD_MRD1: 4

## 2018-02-19 ASSESSMENT — LAGOPHTHALMOS
OD_LAGOPHTHALMOS: 0
OS_LAGOPHTHALMOS: 0

## 2018-02-19 ASSESSMENT — LEVATOR FUNCTION
OD_LEVATOR: 15
OS_LEVATOR: 15

## 2018-02-19 ASSESSMENT — VISUAL ACUITY
OS_CC: 20/20
OD_CC: 20/20
OS_CC+: -3
CORRECTION_TYPE: GLASSES
METHOD: SNELLEN - LINEAR

## 2018-02-19 ASSESSMENT — SLIT LAMP EXAM - LIDS
COMMENTS: LID IN GOOD POSITION
COMMENTS: PTOSIS

## 2018-02-19 ASSESSMENT — TONOMETRY
OS_IOP_MMHG: 15
OD_IOP_MMHG: 15
IOP_METHOD: ICARE

## 2018-02-19 NOTE — PROGRESS NOTES
Oculoplastic Clinic New Patient    Patient: Yuko Arambula MRN# [id number]   YOB: 1990 Age: 27 year old   Date of Visit: Feb 19, 2018    CC: Droopy eyelids obstructing vision.  Chief Complaints and History of Present Illnesses   Patient presents with     Post Op (Ophthalmology) Right Eye     Right Upper Lid Ptosis Repair                 HPI:     Yuko Arambula is a 27 year old female who has noted gradual onset of a droopy eyelid on the left side. She is s/p MMCR on the right x 2 months with good visual result. She is now having more difficulty with her left eye.  She has also developed a new astigmatism in the left eye, which she attributes to the drooping. The patient denies double vision, variability of the eyelid position, or dry eye symptoms. She underwent a single fiber EMG, which was negative for myasthenia.  No prior trauma or other injury.  Has slowly gotten worse over years.     EXAM:      MRD1: 4 right post surgery, 2 left  Excellent response to phenylephrine without significant Herring  Right eye dominant     VISUAL FIELD:  Left eye untaped:15 degrees                     Left eye taped:55 degrees    Doing well postop right upper eyelid MMCR. Happy with outcome.  - continue warm packs  - stop drops  Return to clinic in 2 months.      Assessment & Plan     Yuko Arambula is a 27 year old female with the following diagnoses:   1. Myogenic ptosis of left eyelid       Plan: LOMBARDO'S MUSCLE CONJUNCTIVAL RESECTION (8.5mm) left eye    Doing well postop right upper eyelid MMCR. Happy with outcome.      PHOTOS DEMONSTRATE:  Significant dermatochalasis with lids resting on eyelashes and obstructing visual axis    Thao Rivera MD  PGY-2 Ophthalmology    Attending Physician Attestation:  Complete documentation of historical and exam elements from today's encounter can be found in the full encounter summary report (not reduplicated in this progress note).  I personally obtained the chief  complaint(s) and history of present illness.  I confirmed and edited as necessary the review of systems, past medical/surgical history, family history, social history, and examination findings as documented by others; and I examined the patient myself.  I personally reviewed the relevant tests, images, and reports as documented above.  I formulated and edited as necessary the assessment and plan and discussed the findings and management plan with the patient and family. I personally reviewed the ophthalmic test(s) associated with this encounter, agree with the interpretation(s) as documented by the resident/fellow, and have edited the corresponding report(s) as necessary.   -Leo Barba MD    Today with Yuko Arambula, I reviewed the indications, risks, benefits, and alternatives of the proposed surgical procedure including, but not limited to, failure obtain the desired result  and need for additional surgery, bleeding, infection, loss of vision, loss of the eye, and the remote possibility of permanent damage to any organ system or death with the use of anesthesia.  I provided multiple opportunities for the questions, answered all questions to the best of my ability, and confirmed that my answers and my discussion were understood.     - Leo Barba MD 8:32 AM 2/19/2018

## 2018-02-19 NOTE — NURSING NOTE
Chief Complaints and History of Present Illnesses   Patient presents with     Post Op (Ophthalmology) Right Eye     Right Upper Lid Ptosis Repair     HPI    Affected eye(s):  Right   Symptoms:     No blurred vision   Itching      Frequency:  Constant       Do you have eye pain now?:  No      Comments:  CLs not working well right now. Only lasting a week. Eyes both getting dry. Patient denies eye pain or irritation, but a little itching. Does not use any eye drops.      Susan Avila COT 7:53 AM February 19, 2018

## 2018-02-19 NOTE — MR AVS SNAPSHOT
After Visit Summary   2/19/2018    Yuko Arambula    MRN: [id number]           Patient Information     Date Of Birth          1990        Visit Information        Provider Department      2/19/2018 8:30 AM Leo Barba MD [facility] Ophthalmology        Today's Diagnoses     Myogenic ptosis of left eyelid    -  1       Follow-ups after your visit        Your next 10 appointments already scheduled     Mar 14, 2018   Procedure with Leo Barba MD   [facility] ([facility])    [address] [place] [address]  5th [facility] [address] [phone]           Located in the Clinics and Surgery Center at [address] [place] [address].   parking is very convenient and highly recommended.  is a $6 flat rate fee.  Both  and self parkers should enter the main arrival plaza from [facility]; parking attendants will direct you based on your parking preference.            Mar 30, 2018 11:00 AM CDT   (Arrive by 10:45 AM)   Post-Op with Leo Barba MD   [facility] Ophthalmology ([facility])    [address] [place] [address] [phone]              Who to contact     Please call your clinic at [phone] to:    Ask questions about your health    Make or cancel appointments    Discuss your medicines    Learn about your test results    Speak to your doctor            Additional Information About Your Visit        MyChart Information     [email or web address] gives you secure access to your electronic health record. If you see a primary care provider, you can also send messages to your care team and make appointments. If you have questions, please call your primary care clinic.  If you do not have a primary care provider, please call [phone] and they will assist you.      [email or web address] is an electronic gateway that provides easy, online access to your medical records. With [email or web address],  you can request a clinic appointment, read your test results, renew a prescription or communicate with your care team.     To access your existing account, please contact your [facility] or call [phone] for assistance.        Care EveryWhere ID     This is your Care EveryWhere ID. This could be used by other organizations to access your [facility] medical records  [id number]         Blood Pressure from Last 3 Encounters:   12/20/17 107/62   01/28/15 117/75   01/19/15 114/72    Weight from Last 3 Encounters:   12/20/17 59 kg (130 lb)   09/18/17 62.6 kg (138 lb)   01/28/15 68 kg (150 lb)              We Performed the Following     External Photos OU (both eyes)     [place] VF Ptosis OS     Clarissa-Operative Worksheet (Plastics)        Primary Care Provider Fax #    Physician No Ref-Primary [phone]       No address on file        Equal Access to Services     DALE HARRISON : Hadii salud Garibay, waaxda luradha, qaybta kaalmada naman, michael preciado . So [facility] [phone].    ATENCIÓN: Si habla español, tiene a alexis disposición servicios gratuitos de asistencia lingüística. Llame al [phone].    We comply with applicable federal civil rights laws and [place] laws. We do not discriminate on the basis of race, color, national origin, age, disability, sex, sexual orientation, or gender identity.            Thank you!     Thank you for choosing [facility] OPHTHALMOLOGY  for your care. Our goal is always to provide you with excellent care. Hearing back from our patients is one way we can continue to improve our services. Please take a few minutes to complete the written survey that you may receive in the mail after your visit with us. Thank you!             Your Updated Medication List - Protect others around you: Learn how to safely use, store and throw away your medicines at www.disposemymeds.org.          This list is accurate as of 2/19/18   8:42 AM.  Always use your most recent med list.                   Brand Name Dispense Instructions for use Diagnosis    albuterol 108 (90 BASE) MCG/ACT Inhaler    PROAIR HFA/PROVENTIL HFA/VENTOLIN HFA     Inhale 2 puffs into the lungs every 6 hours        HYDROcodone-acetaminophen 5-325 MG per tablet    NORCO    10 tablet    Take 1 tablet by mouth every 6 hours as needed for pain Maximum of 4000 mg of acetaminophen in 24 hours.    Postoperative eye state       lamoTRIgine 100 MG tablet    LaMICtal     Take 100 mg by mouth daily        loratadine 10 MG tablet    CLARITIN     Take 10 mg by mouth daily        neomycin-polymyxin-dexamethasone 3.5-71237-0.1 Susp ophthalmic susp    MAXITROL    1 Bottle    Place 1 drop into the right eye 4 times daily    Postoperative eye state       norgestimate-ethinyl estradiol 0.25-35 MG-MCG per tablet    ORTHO-CYCLEN, SPRINTEC     Take 1 tablet by mouth daily        XANAX PO      Take 0.5 mg by mouth once as needed for anxiety

## 2018-03-13 ENCOUNTER — ANESTHESIA EVENT (OUTPATIENT)
Dept: SURGERY | Facility: AMBULATORY SURGERY CENTER | Age: 28
End: 2018-03-13

## 2018-03-14 ENCOUNTER — ANESTHESIA (OUTPATIENT)
Dept: SURGERY | Facility: AMBULATORY SURGERY CENTER | Age: 28
End: 2018-03-14

## 2018-03-14 ENCOUNTER — SURGERY (OUTPATIENT)
Age: 28
End: 2018-03-14

## 2018-03-14 ENCOUNTER — HOSPITAL ENCOUNTER (OUTPATIENT)
Facility: AMBULATORY SURGERY CENTER | Age: 28
End: 2018-03-14
Attending: OPHTHALMOLOGY
Payer: COMMERCIAL

## 2018-03-14 VITALS
BODY MASS INDEX: 22.2 KG/M2 | DIASTOLIC BLOOD PRESSURE: 59 MMHG | TEMPERATURE: 98 F | WEIGHT: 130 LBS | OXYGEN SATURATION: 98 % | HEIGHT: 64 IN | SYSTOLIC BLOOD PRESSURE: 103 MMHG | RESPIRATION RATE: 16 BRPM

## 2018-03-14 DIAGNOSIS — Z98.890 POSTOPERATIVE EYE STATE: Primary | ICD-10-CM

## 2018-03-14 RX ORDER — SODIUM CHLORIDE, SODIUM LACTATE, POTASSIUM CHLORIDE, CALCIUM CHLORIDE 600; 310; 30; 20 MG/100ML; MG/100ML; MG/100ML; MG/100ML
INJECTION, SOLUTION INTRAVENOUS CONTINUOUS
Status: DISCONTINUED | OUTPATIENT
Start: 2018-03-14 | End: 2018-03-15 | Stop reason: HOSPADM

## 2018-03-14 RX ORDER — NEOMYCIN SULFATE, POLYMYXIN B SULFATE AND DEXAMETHASONE 3.5; 10000; 1 MG/ML; [USP'U]/ML; MG/ML
1 SUSPENSION/ DROPS OPHTHALMIC 4 TIMES DAILY
Qty: 1 BOTTLE | Refills: 0 | Status: SHIPPED | OUTPATIENT
Start: 2018-03-14 | End: 2019-07-19

## 2018-03-14 RX ORDER — SODIUM CHLORIDE, SODIUM LACTATE, POTASSIUM CHLORIDE, CALCIUM CHLORIDE 600; 310; 30; 20 MG/100ML; MG/100ML; MG/100ML; MG/100ML
INJECTION, SOLUTION INTRAVENOUS CONTINUOUS
Status: DISCONTINUED | OUTPATIENT
Start: 2018-03-14 | End: 2018-03-14 | Stop reason: HOSPADM

## 2018-03-14 RX ORDER — ONDANSETRON 4 MG/1
4 TABLET, ORALLY DISINTEGRATING ORAL EVERY 30 MIN PRN
Status: DISCONTINUED | OUTPATIENT
Start: 2018-03-14 | End: 2018-03-15 | Stop reason: HOSPADM

## 2018-03-14 RX ORDER — TETRACAINE HYDROCHLORIDE 5 MG/ML
SOLUTION OPHTHALMIC PRN
Status: DISCONTINUED | OUTPATIENT
Start: 2018-03-14 | End: 2018-03-14 | Stop reason: HOSPADM

## 2018-03-14 RX ORDER — LIDOCAINE 40 MG/G
CREAM TOPICAL
Status: DISCONTINUED | OUTPATIENT
Start: 2018-03-14 | End: 2018-03-14 | Stop reason: HOSPADM

## 2018-03-14 RX ORDER — LIDOCAINE HYDROCHLORIDE AND EPINEPHRINE 10; 10 MG/ML; UG/ML
INJECTION, SOLUTION INFILTRATION; PERINEURAL PRN
Status: DISCONTINUED | OUTPATIENT
Start: 2018-03-14 | End: 2018-03-14 | Stop reason: HOSPADM

## 2018-03-14 RX ORDER — PSEUDOEPHEDRINE HCL 30 MG
TABLET ORAL
COMMUNITY

## 2018-03-14 RX ORDER — ERYTHROMYCIN 5 MG/G
OINTMENT OPHTHALMIC PRN
Status: DISCONTINUED | OUTPATIENT
Start: 2018-03-14 | End: 2018-03-14 | Stop reason: HOSPADM

## 2018-03-14 RX ORDER — ONDANSETRON 2 MG/ML
4 INJECTION INTRAMUSCULAR; INTRAVENOUS EVERY 30 MIN PRN
Status: DISCONTINUED | OUTPATIENT
Start: 2018-03-14 | End: 2018-03-15 | Stop reason: HOSPADM

## 2018-03-14 RX ORDER — NALOXONE HYDROCHLORIDE 0.4 MG/ML
.1-.4 INJECTION, SOLUTION INTRAMUSCULAR; INTRAVENOUS; SUBCUTANEOUS
Status: DISCONTINUED | OUTPATIENT
Start: 2018-03-14 | End: 2018-03-15 | Stop reason: HOSPADM

## 2018-03-14 RX ORDER — PROPOFOL 10 MG/ML
INJECTION, EMULSION INTRAVENOUS CONTINUOUS PRN
Status: DISCONTINUED | OUTPATIENT
Start: 2018-03-14 | End: 2018-03-14

## 2018-03-14 RX ORDER — FENTANYL CITRATE 50 UG/ML
INJECTION, SOLUTION INTRAMUSCULAR; INTRAVENOUS PRN
Status: DISCONTINUED | OUTPATIENT
Start: 2018-03-14 | End: 2018-03-14

## 2018-03-14 RX ORDER — ONDANSETRON 2 MG/ML
INJECTION INTRAMUSCULAR; INTRAVENOUS PRN
Status: DISCONTINUED | OUTPATIENT
Start: 2018-03-14 | End: 2018-03-14

## 2018-03-14 RX ORDER — HYDROCODONE BITARTRATE AND ACETAMINOPHEN 5; 325 MG/1; MG/1
1 TABLET ORAL EVERY 6 HOURS PRN
Qty: 10 TABLET | Refills: 0 | Status: SHIPPED | OUTPATIENT
Start: 2018-03-14 | End: 2019-08-21

## 2018-03-14 RX ORDER — GABAPENTIN 300 MG/1
300 CAPSULE ORAL ONCE
Status: COMPLETED | OUTPATIENT
Start: 2018-03-14 | End: 2018-03-14

## 2018-03-14 RX ORDER — ACETAMINOPHEN 325 MG/1
975 TABLET ORAL ONCE
Status: COMPLETED | OUTPATIENT
Start: 2018-03-14 | End: 2018-03-14

## 2018-03-14 RX ORDER — PROPOFOL 10 MG/ML
INJECTION, EMULSION INTRAVENOUS PRN
Status: DISCONTINUED | OUTPATIENT
Start: 2018-03-14 | End: 2018-03-14

## 2018-03-14 RX ADMIN — ERYTHROMYCIN 1 INCH: 5 OINTMENT OPHTHALMIC at 08:10

## 2018-03-14 RX ADMIN — FENTANYL CITRATE 25 MCG: 50 INJECTION, SOLUTION INTRAMUSCULAR; INTRAVENOUS at 08:02

## 2018-03-14 RX ADMIN — PROPOFOL 30 MG: 10 INJECTION, EMULSION INTRAVENOUS at 07:56

## 2018-03-14 RX ADMIN — PROPOFOL 40 MCG/KG/MIN: 10 INJECTION, EMULSION INTRAVENOUS at 07:53

## 2018-03-14 RX ADMIN — LIDOCAINE HYDROCHLORIDE AND EPINEPHRINE 3 ML: 10; 10 INJECTION, SOLUTION INFILTRATION; PERINEURAL at 07:55

## 2018-03-14 RX ADMIN — SODIUM CHLORIDE, SODIUM LACTATE, POTASSIUM CHLORIDE, CALCIUM CHLORIDE: 600; 310; 30; 20 INJECTION, SOLUTION INTRAVENOUS at 07:44

## 2018-03-14 RX ADMIN — ACETAMINOPHEN 975 MG: 325 TABLET ORAL at 06:52

## 2018-03-14 RX ADMIN — ONDANSETRON 4 MG: 2 INJECTION INTRAMUSCULAR; INTRAVENOUS at 07:52

## 2018-03-14 RX ADMIN — TETRACAINE HYDROCHLORIDE 2 DROP: 5 SOLUTION OPHTHALMIC at 07:55

## 2018-03-14 RX ADMIN — GABAPENTIN 300 MG: 300 CAPSULE ORAL at 06:52

## 2018-03-14 RX ADMIN — FENTANYL CITRATE 50 MCG: 50 INJECTION, SOLUTION INTRAMUSCULAR; INTRAVENOUS at 07:50

## 2018-03-14 RX ADMIN — PROPOFOL 20 MG: 10 INJECTION, EMULSION INTRAVENOUS at 08:02

## 2018-03-14 RX ADMIN — PROPOFOL 50 MG: 10 INJECTION, EMULSION INTRAVENOUS at 07:50

## 2018-03-14 NOTE — ANESTHESIA POSTPROCEDURE EVALUATION
Patient: Yuko Arambula    Procedure(s):  Left Upper Eyelid Ptosis Repair  - Wound Class: I-Clean    Diagnosis:Myogenic Ptosis Of Left Eyelid   Diagnosis Additional Information: No value filed.    Anesthesia Type:  No value filed.    Note:  Anesthesia Post Evaluation    Patient location during evaluation: PACU  Patient participation: Able to fully participate in evaluation  Level of consciousness: awake  Pain management: adequate  Airway patency: patent  Cardiovascular status: acceptable  Respiratory status: acceptable  Hydration status: balanced  PONV: none     Anesthetic complications: None          Last vitals:  Vitals:    03/14/18 0623 03/14/18 0818   BP:  103/59   Resp:  16   Temp: 36.8  C (98.3  F) 36.7  C (98  F)   SpO2:  98%         Electronically Signed By: Eric Beyer MD  March 14, 2018  1:04 PM

## 2018-03-14 NOTE — DISCHARGE INSTRUCTIONS
[facility]  Home Care Following Anesthesia  For 24 hours after surgery:  1. Get plenty of rest.  A responsible adult must stay with you for at least 24 hours after you leave the surgery center.  2. Do not drive or use heavy equipment.  If you have weakness or tingling, don't drive or use heavy equipment until this feeling goes away.   3. Do not drink alcohol.   4. Avoid strenuous or risky activities.  Ask for help when climbing stairs.  5. You may feel lightheaded.  IF so, sit for a few minutes before standing.  Have someone help you get up.   6. If you have nausea (feel sick to your stomach): Drink only clear liquids such as apple juice, ginger ale, broth or 7-Up.  Rest may also help.  Be sure to drink enough fluids.  Move to a regular diet as you feel able.   7. You may have a slight fever.  Call the doctor if your fever is over 100 F (37.7 C) (taken under the tongue) or lasts longer than 24 hours.  8. You may have a dry mouth, a sore throat, muscle aches or trouble sleeping. These should go away after 24 hours.  9. Do not make important or legal decisions.               Tips for taking pain medications  To get the best pain relief possible, remember these points:    Take pain medications as directed, before pain becomes severe.    Pain medication can upset your stomach: taking it with food may help.    Constipation is a common side effect of pain medication. Drink plenty of  fluids.    Eat foods high in fiber. Take a stool softener if recommended by your doctor or pharmacist.    Do not drink alcohol, drive or operate machinery while taking pain medications.    Ask about other ways to control pain, such as with heat, ice or relaxation.    Tylenol/Acetaminophen Consumption  To help encourage the safe use of acetaminophen, the makers of TYLENOL  have lowered the maximum daily dose for single-ingredient Extra Strength TYLENOL  (acetaminophen) products sold in the [place] from 8  pills per day (4,000 mg) to 6 pills per day (3,000 mg). The dosing interval has also changed from 2 pills every 4-6 hours to 2 pills every 6 hours.    If you feel your pain relief is insufficient, you may take Tylenol/Acetaminophen in addition to your narcotic pain medication.     Be careful not to exceed 3,000 mg of Tylenol/Acetaminophen in a 24 hour period from all sources.    If you are taking extra strength Tylenol/acetaminophen (500 mg), the maximum dose is 6 tablets in 24 hours.    If you are taking regular strength acetaminophen (325 mg), the maximum dose is 9 tablets in 24 hours.    Call a doctor for any of the followin. Signs of infection (fever, growing tenderness at the surgery site, a large amount of drainage or bleeding, severe pain, foul-smelling drainage, redness, swelling).  2. It has been over 8 to 10 hours since surgery and you are still not able to urinate (pass water).  3. Headache for over 24 hours.  4. Numbness, tingling or weakness the day after surgery (if you had spinal anesthesia).  Your doctor is:  Dr. Leo Barba, Ophthalmology: [phone]                    Or dial [phone] and ask for the resident on call for:  Ophthalmology  For emergency care, call the:  [place] [facility]:  [phone] (TTY for hearing impaired: [phone])            Post-operative Instructions    Ophthalmic Plastic and Reconstructive Surgery  Leo Barba M.D.  Rosendo Conklin M.D.  Kelsi Harvey M.D.    All instructions apply to the operated eye(s) or eyelid(s)      What to expect after surgery:    There will be some swelling, bruising, and likely a black eye (even into the lower eyelids and cheeks). Also expect crusting and discharge from the eye and/or incisions.     A small amount of surface bleeding is normal for the first 48 hours after surgery.    You may notice some bloody tears for the first few days after surgery. This is normal.    Your eye(s) and eyelid(s) may be  painful and tender. This is normal after surgery. Use the pain medication as prescribed. If your pain does not improve despite the medication, contact the office.    Wound care and personal care:    If a patch or bandage has been placed, please leave this in place until seen in clinic. Prevent the bandage from getting wet.     Apply ice compresses 15 minutes on 15 minutes off while awake for the first 2 days after surgery, then switch to warm compresses 4 times a day until seen by your physician.     For warm packs you can place a cup of dry uncooked rice in a clean cotton sock. Place sock in microwave 30 seconds to one minute. Next place the warm sock into a plastic bag and wrap the bag with clean warm wet washcloth and place over operated eye.      You may shower or wash your hair the day after surgery. Do not bathe or go swimming for 1 week to prevent contamination of your wounds.    Do not apply make-up to the eyes or eyelids for 2 weeks after surgery.      Activity restrictions and driving:    Avoid heavy lifting, bending, exercise or strenuous activity for 1 week after surgery.    You may resume other activities and return to work as tolerated.    You may not resume driving until have you stopped using narcotic pain medications(such as Norco, Percocet, Tylenol #3).    Medications:    Restart all your regular home medications and eye drops today. If you take Plavix or Aspirin on a regular basis, wait for 3 days after your surgery before restarting these in order to decrease the risk of bleeding complications.    Avoid aspirin and aspirin-like medications (Motrin, Aleve, Ibuprofen, Tasha-Elmer etc) for 5 days to reduce the risk of bleeding. You may take Tylenol (acetaminophen) for pain.    In addition to your home medications, take the following post-operative medications as prescribed by your physician:    Instill eye drops (Maxitrol) four times a day until the bottle finished.    Take 1 to 2 pain pills (norco  or tylenol 3 as prescribed) as needed for pain up to every 4 hours.    The pain pills may make you drowsy. You must not drive a car, operate heavy machinery or drink alcohol while taking them.    The pain pills may cause constipation and nausea. Take them with some food to prevent a stomach upset. If you continue to experience nausea, call your physician.      WARNING: All the prescription pain medications listed above contain Tylenol (acetaminophen). You must not take more than 4,000 mg of acetaminophen per 24-hour period. This is equivalent to 6 tablets of Darvocet, 8 tablets of Vicodin, or 12 tablets of Norco, Percocet or Tylenol #3. If you take other over-the-counter medications containing acetaminophen, you must take the amount of acetaminophen into account and reduce the number of prescribed pain pills accordingly.    Contact information and follow-up:    Return to the Eye Clinic for a follow-up appointment with your physician as  scheduled. If no appointment has been scheduled, call [phone] for an  appointment with Dr. Barba within 1 to 2 weeks from your date of surgery.      For severe pain, bleeding, or loss of vision, call the Eye Clinic at [phone].    After hours or on weekends and holidays, call [phone] and ask to speak with the ophthalmologist on call.

## 2018-03-14 NOTE — ANESTHESIA PREPROCEDURE EVALUATION
Anesthesia Evaluation     . Pt has had prior anesthetic.     No history of anesthetic complications          ROS/MED HX    ENT/Pulmonary:  - neg pulmonary ROS   (+)Intermittent asthma , . .    Neurologic:  - neg neurologic ROS     Cardiovascular:  - neg cardiovascular ROS       METS/Exercise Tolerance:     Hematologic:  - neg hematologic  ROS       Musculoskeletal:  - neg musculoskeletal ROS       GI/Hepatic:  - neg GI/hepatic ROS       Renal/Genitourinary:  - ROS Renal section negative       Endo:  - neg endo ROS       Psychiatric:  - neg psychiatric ROS   (+) psychiatric history anxiety and depression      Infectious Disease:  - neg infectious disease ROS       Malignancy:      - no malignancy   Other:    - neg other ROS                 Physical Exam  Normal systems: cardiovascular, pulmonary and dental    Airway   Mallampati: II  TM distance: >3 FB  Neck ROM: full    Dental     Cardiovascular       Pulmonary                     Anesthesia Plan      History & Physical Review  History and physical reviewed and following examination; no interval change.    ASA Status:  1 .    NPO Status:  > 8 hours    Plan for MAC with Intravenous induction. Maintenance will be TIVA.  Reason for MAC:  Procedure to face, neck, head or breast  PONV prophylaxis:  Ondansetron (or other 5HT-3) and Dexamethasone or Solumedrol       Postoperative Care  Postoperative pain management:  IV analgesics, Oral pain medications and Multi-modal analgesia.      Consents  Anesthetic plan, risks, benefits and alternatives discussed with:  Patient..                          .

## 2018-03-14 NOTE — OP NOTE
PREOPERATIVE DIAGNOSIS: Left upper eyelid ptosis.   POSTOPERATIVE DIAGNOSIS:  Left upper eyelid ptosis.   PROCEDURE:Left upper eyelid ptosis repair by Raines's muscle conjunctival resection.   SURGEON: Leo Barba MD  ASSISTANT: Kelsi Harvey MD   ANESTHESIA: Monitored with local infiltration of 1% lidocaine with epinephrine [id number].   COMPLICATIONS: None.   ESTIMATED BLOOD LOSS: Less than 5 cc.   HISTORY: Yuko Arambula presented with upper lid ptosis interfering with the superior visual field and activities of daily living. After the risks, benefits and alternatives to the proposed procedure were explained, informed consent was obtained.   PROCEDURE: The patient was brought to the operating room and placed supine on the operating table. IV sedation was given. The left upper eyelid was infiltrated with local anesthetic and  was prepped and draped in the typical sterile ophthalmic fashion. Atttention was directed to the left  side.  A 4-0 silk suture was placed through the eyelid margin and the eyelid everted over a Desmarres retractor. A 6-0 silk suture was then threaded through the conjunctiva and Raines's muscle 4.25 mm from the superior tarsal border. These sutures were used to elevate the conjunctiva and Raines's muscle which was gently peeled from the underlying levator muscle. The Putterman clamp was used and clamped over the elevated tissues. A 6-0 plain gut suture was run in a horizontal mattress fashion 1 mm below the clamp from lateral to medial, then medial to lateral. The elevated tissues were excised with a 15 blade. The sutures were then externalized and tied in the lid crease. The 4-0 silk suture was removed. The lid was reverted to its normal position and ophthalmic antibiotic ointment placed in the eye.  The patient tolerated the procedure well and left the operating room in stable condition.   LEO BARBA MD

## 2018-03-14 NOTE — IP AVS SNAPSHOT
[facility]    [address] [place] [address]    Phone:  [phone]    Fax:  [phone]                                       After Visit Summary   3/14/2018    Yuko Arambula    MRN: [id number]           After Visit Summary Signature Page     I have received my discharge instructions, and my questions have been answered. I have discussed any challenges I see with this plan with the nurse or doctor.    ..........................................................................................................................................  Patient/Patient Representative Signature      ..........................................................................................................................................  Patient Representative Print Name and Relationship to Patient    ..................................................               ................................................  Date                                            Time    ..........................................................................................................................................  Reviewed by Signature/Title    ...................................................              ..............................................  Date                                                            Time

## 2018-03-14 NOTE — IP AVS SNAPSHOT
MRN:[id number]                      After Visit Summary   3/14/2018    Yuko Arambula    MRN: [id number]           Thank you!     Thank you for choosing [facility] for your care. Our goal is always to provide you with excellent care. Hearing back from our patients is one way we can continue to improve our services. Please take a few minutes to complete the written survey that you may receive in the mail after you visit with us. Thank you!        Patient Information     Date Of Birth          1990        About your hospital stay     You were admitted on:  March 14, 2018 You last received care in the:  [facility]    You were discharged on:  March 14, 2018       Who to Call     For medical emergencies, please call 911.  For non-urgent questions about your medical care, please call your primary care provider or clinic, None  For questions related to your surgery, please call your surgery clinic        Attending Provider     Provider Leo Pabon MD Ophthalmology       Primary Care Provider Fax #    Physician No Ref-Primary [phone]      After Care Instructions     Discharge Medication Instructions       Do NOT take aspirin or medications containing NSAIDS for 72 hours after procedure.            Ice to affected area       Apply cold pack for 15 minutes on, 15 minutes off, for 48 hours while awake.                  Your next 10 appointments already scheduled     Mar 30, 2018 11:00 AM CDT   (Arrive by 10:45 AM)   Post-Op with Leo Barba MD   [facility] ([facility])    [address] [place] [id number] [phone]              Further instructions from your care team       [facility]  Home Care Following Anesthesia  For 24 hours after surgery:  1. Get plenty of rest.  A responsible adult must stay with you for at least 24 hours after you leave  the surgery center.  2. Do not drive or use heavy equipment.  If you have weakness or tingling, don't drive or use heavy equipment until this feeling goes away.   3. Do not drink alcohol.   4. Avoid strenuous or risky activities.  Ask for help when climbing stairs.  5. You may feel lightheaded.  IF so, sit for a few minutes before standing.  Have someone help you get up.   6. If you have nausea (feel sick to your stomach): Drink only clear liquids such as apple juice, ginger ale, broth or 7-Up.  Rest may also help.  Be sure to drink enough fluids.  Move to a regular diet as you feel able.   7. You may have a slight fever.  Call the doctor if your fever is over 100 F (37.7 C) (taken under the tongue) or lasts longer than 24 hours.  8. You may have a dry mouth, a sore throat, muscle aches or trouble sleeping. These should go away after 24 hours.  9. Do not make important or legal decisions.               Tips for taking pain medications  To get the best pain relief possible, remember these points:    Take pain medications as directed, before pain becomes severe.    Pain medication can upset your stomach: taking it with food may help.    Constipation is a common side effect of pain medication. Drink plenty of  fluids.    Eat foods high in fiber. Take a stool softener if recommended by your doctor or pharmacist.    Do not drink alcohol, drive or operate machinery while taking pain medications.    Ask about other ways to control pain, such as with heat, ice or relaxation.    Tylenol/Acetaminophen Consumption  To help encourage the safe use of acetaminophen, the makers of TYLENOL  have lowered the maximum daily dose for single-ingredient Extra Strength TYLENOL  (acetaminophen) products sold in the [place] from 8 pills per day (4,000 mg) to 6 pills per day (3,000 mg). The dosing interval has also changed from 2 pills every 4-6 hours to 2 pills every 6 hours.    If you feel your pain relief is insufficient, you may take  Tylenol/Acetaminophen in addition to your narcotic pain medication.     Be careful not to exceed 3,000 mg of Tylenol/Acetaminophen in a 24 hour period from all sources.    If you are taking extra strength Tylenol/acetaminophen (500 mg), the maximum dose is 6 tablets in 24 hours.    If you are taking regular strength acetaminophen (325 mg), the maximum dose is 9 tablets in 24 hours.    Call a doctor for any of the followin. Signs of infection (fever, growing tenderness at the surgery site, a large amount of drainage or bleeding, severe pain, foul-smelling drainage, redness, swelling).  2. It has been over 8 to 10 hours since surgery and you are still not able to urinate (pass water).  3. Headache for over 24 hours.  4. Numbness, tingling or weakness the day after surgery (if you had spinal anesthesia).  Your doctor is:  Dr. Leo Barba, Ophthalmology: [phone]                    Or dial [phone] and ask for the resident on call for:  Ophthalmology  For emergency care, call the:  [facility]:  [phone] (TTY for hearing impaired: [phone])            Post-operative Instructions    Ophthalmic Plastic and Reconstructive Surgery  Leo Barba M.D.  Rosendo Conklin M.D.  Kelsi Harvey M.D.    All instructions apply to the operated eye(s) or eyelid(s)      What to expect after surgery:    There will be some swelling, bruising, and likely a black eye (even into the lower eyelids and cheeks). Also expect crusting and discharge from the eye and/or incisions.     A small amount of surface bleeding is normal for the first 48 hours after surgery.    You may notice some bloody tears for the first few days after surgery. This is normal.    Your eye(s) and eyelid(s) may be painful and tender. This is normal after surgery. Use the pain medication as prescribed. If your pain does not improve despite the medication, contact the office.    Wound care and personal care:    If a patch  or bandage has been placed, please leave this in place until seen in clinic. Prevent the bandage from getting wet.     Apply ice compresses 15 minutes on 15 minutes off while awake for the first 2 days after surgery, then switch to warm compresses 4 times a day until seen by your physician.     For warm packs you can place a cup of dry uncooked rice in a clean cotton sock. Place sock in microwave 30 seconds to one minute. Next place the warm sock into a plastic bag and wrap the bag with clean warm wet washcloth and place over operated eye.      You may shower or wash your hair the day after surgery. Do not bathe or go swimming for 1 week to prevent contamination of your wounds.    Do not apply make-up to the eyes or eyelids for 2 weeks after surgery.      Activity restrictions and driving:    Avoid heavy lifting, bending, exercise or strenuous activity for 1 week after surgery.    You may resume other activities and return to work as tolerated.    You may not resume driving until have you stopped using narcotic pain medications(such as Norco, Percocet, Tylenol #3).    Medications:    Restart all your regular home medications and eye drops today. If you take Plavix or Aspirin on a regular basis, wait for 3 days after your surgery before restarting these in order to decrease the risk of bleeding complications.    Avoid aspirin and aspirin-like medications (Motrin, Aleve, Ibuprofen, [place] etc) for 5 days to reduce the risk of bleeding. You may take Tylenol (acetaminophen) for pain.    In addition to your home medications, take the following post-operative medications as prescribed by your physician:    Instill eye drops (Maxitrol) four times a day until the bottle finished.    Take 1 to 2 pain pills (norco or tylenol 3 as prescribed) as needed for pain up to every 4 hours.    The pain pills may make you drowsy. You must not drive a car, operate heavy machinery or drink alcohol while taking them.    The pain  "pills may cause constipation and nausea. Take them with some food to prevent a stomach upset. If you continue to experience nausea, call your physician.      WARNING: All the prescription pain medications listed above contain Tylenol (acetaminophen). You must not take more than 4,000 mg of acetaminophen per 24-hour period. This is equivalent to 6 tablets of Darvocet, 8 tablets of Vicodin, or 12 tablets of Norco, Percocet or Tylenol #3. If you take other over-the-counter medications containing acetaminophen, you must take the amount of acetaminophen into account and reduce the number of prescribed pain pills accordingly.    Contact information and follow-up:    Return to the Eye Clinic for a follow-up appointment with your physician as  scheduled. If no appointment has been scheduled, call [phone] for an  appointment with Dr. Barba within 1 to 2 weeks from your date of surgery.      For severe pain, bleeding, or loss of vision, call the Eye Clinic at [phone].    After hours or on weekends and holidays, call [phone] and ask to speak with the ophthalmologist on call.      Pending Results     No orders found from 3/12/2018 to 3/15/2018.            Admission Information     Date & Time Provider Department Dept. Phone    3/14/2018 Leo Barba MD [facility] [phone]      Your Vitals Were     Temperature Height Weight Last Period BMI (Body Mass Index)       98.3  F (36.8  C) (Oral) 1.626 m (5' 4\") 59 kg (130 lb) 02/05/2018 22.31 kg/m2       [facility] Information     [facility] gives you secure access to your electronic health record. If you see a primary care provider, you can also send messages to your care team and make appointments. If you have questions, please call your primary care clinic.  If you do not have a primary care provider, please call [phone] and they will assist you.      [facility] is an electronic gateway that provides easy, online access to your " medical records. With [employer], you can request a clinic appointment, read your test results, renew a prescription or communicate with your care team.     To access your existing account, please contact your [facility] or call [phone] for assistance.        Care EveryWhere ID     This is your Care EveryWhere ID. This could be used by other organizations to access your [facility] medical records  [id number]        Equal Access to Services     DALE HARRISON : Hadii aad ku hadasho Soomaali, waaxda luqadaha, qaybta kaalmada adeegyada, waxay idiin hayeileenn juned khanportia laeleonora quintana. So [facility] [phone].    ATENCIÓN: Si habla reyna, tiene a alexis disposición servicios gratuitos de asistencia lingüística. Llame al [phone].    We comply with applicable federal civil rights laws and [place] laws. We do not discriminate on the basis of race, color, national origin, age, disability, sex, sexual orientation, or gender identity.               Review of your medicines      CONTINUE these medicines which may have CHANGED, or have new prescriptions. If we are uncertain of the size of tablets/capsules you have at home, strength may be listed as something that might have changed.        Dose / Directions    * HYDROcodone-acetaminophen 5-325 MG per tablet   Commonly known as:  NORCO   This may have changed:  Another medication with the same name was added. Make sure you understand how and when to take each.   Used for:  Postoperative eye state        Dose:  1 tablet   Take 1 tablet by mouth every 6 hours as needed for pain Maximum of 4000 mg of acetaminophen in 24 hours.   Quantity:  10 tablet   Refills:  0       * HYDROcodone-acetaminophen 5-325 MG per tablet   Commonly known as:  NORCO   This may have changed:  You were already taking a medication with the same name, and this prescription was added. Make sure you understand how and when to take each.   Used for:  Postoperative eye state         Dose:  1 tablet   Take 1 tablet by mouth every 6 hours as needed for pain Maximum of 4000 mg of acetaminophen in 24 hours.   Quantity:  10 tablet   Refills:  0       * neomycin-polymyxin-dexamethasone 3.5-69446-5.1 Susp ophthalmic susp   Commonly known as:  MAXITROL   This may have changed:  Another medication with the same name was added. Make sure you understand how and when to take each.   Used for:  Postoperative eye state        Dose:  1 drop   Place 1 drop into the right eye 4 times daily   Quantity:  1 Bottle   Refills:  0       * neomycin-polymyxin-dexamethasone 3.5-64833-5.1 Susp ophthalmic susp   Commonly known as:  MAXITROL   This may have changed:  You were already taking a medication with the same name, and this prescription was added. Make sure you understand how and when to take each.   Used for:  Postoperative eye state        Dose:  1 drop   Place 1 drop Into the left eye 4 times daily   Quantity:  1 Bottle   Refills:  0       * Notice:  This list has 4 medication(s) that are the same as other medications prescribed for you. Read the directions carefully, and ask your doctor or other care provider to review them with you.      CONTINUE these medicines which have NOT CHANGED        Dose / Directions    albuterol 108 (90 BASE) MCG/ACT Inhaler   Commonly known as:  PROAIR HFA/PROVENTIL HFA/VENTOLIN HFA        Dose:  2 puff   Inhale 2 puffs into the lungs every 6 hours   Refills:  0       IBUPROFEN PO        Dose:  400 mg   Take 400 mg by mouth once as needed for moderate pain   Refills:  0       lamoTRIgine 100 MG tablet   Commonly known as:  LaMICtal        Dose:  100 mg   Take 100 mg by mouth daily   Refills:  0       loratadine 10 MG tablet   Commonly known as:  CLARITIN        Dose:  10 mg   Take 10 mg by mouth daily   Refills:  0       norgestimate-ethinyl estradiol 0.25-35 MG-MCG per tablet   Commonly known as:  ORTHO-CYCLEN, SPRINTEC        Dose:  1 tablet   Take 1 tablet by mouth daily    Refills:  0       pseudoePHEDrine 30 MG tablet   Commonly known as:  SUDAFED        Refills:  0       XANAX PO        Dose:  0.5 mg   Take 0.5 mg by mouth once as needed for anxiety   Refills:  0            Where to get your medicines      These medications were sent to [place], [place] - [address]  [address], [facility] [address]    Hours:  TRANSPLANT PHONE NUMBER [phone] Phone:  [phone]     neomycin-polymyxin-dexamethasone 3.5-84067-6.1 Susp ophthalmic susp         Some of these will need a paper prescription and others can be bought over the counter. Ask your nurse if you have questions.     Bring a paper prescription for each of these medications     HYDROcodone-acetaminophen 5-325 MG per tablet                Protect others around you: Learn how to safely use, store and throw away your medicines at www.disposemymeds.org.        Information about OPIOIDS     PRESCRIPTION OPIOIDS: WHAT YOU NEED TO KNOW    Prescription opioids can be used to help relieve moderate to severe pain and are often prescribed following a surgery or injury, or for certain health conditions. These medications can be an important part of treatment but also come with serious risks. It is important to work with your health care provider to make sure you are getting the safest, most effective care.    WHAT ARE THE RISKS AND SIDE EFFECTS OF OPIOID USE?  Prescription opioids carry serious risks of addiction and overdose, especially with prolonged use. An opioid overdose, often marked by slowed breathing can cause sudden death. The use of prescription opioids can have a number of side effects as well, even when taken as directed:      Tolerance - meaning you might need to take more of a medication for the same pain relief    Physical dependence - meaning you have symptoms of withdrawal when a medication is stopped    Increased sensitivity to  pain    Constipation    Nausea, vomiting, and dry mouth    Sleepiness and dizziness    Confusion    Depression    Low levels of testosterone that can result in lower sex drive, energy, and strength    Itching and sweating    RISKS ARE GREATER WITH:    History of drug misuse, substance use disorder, or overdose    Mental health conditions (such as depression or anxiety)    Sleep apnea    Older age (65 years or older)    Pregnancy    Avoid alcohol while taking prescription opioids.   Also, unless specifically advised by your health care provider, medications to avoid include:    Benzodiazepines (such as Xanax or Valium)    Muscle relaxants (such as Soma or Flexeril)    Hypnotics (such as Ambien or Lunesta)    Other prescription opioids    KNOW YOUR OPTIONS:  Talk to your health care provider about ways to manage your pain that do not involve prescription opioids. Some of these options may actually work better and have fewer risks and side effects:    Pain relievers such as acetaminophen, ibuprofen, and naproxen    Some medications that are also used for depression or seizures    Physical therapy and exercise    Cognitive behavioral therapy, a psychological, goal-directed approach, in which patients learn how to modify physical, behavioral, and emotional triggers of pain and stress    IF YOU ARE PRESCRIBED OPIOIDS FOR PAIN:    Never take opioids in greater amounts or more often than prescribed    Follow up with your primary health care provider and work together to create a plan on how to manage your pain.    Talk about ways to help manage your pain that do not involve prescription opioids    Talk about all concerns and side effects    Help prevent misuse and abuse    Never sell or share prescription opioids    Never use another person's prescription opioids    Store prescription opioids in a secure place and out of reach of others (this may include visitors, children, friends, and family)    Visit  www.cdc.gov/drugoverdose to learn about risks of opioid abuse and overdose    If you believe you may be struggling with addiction, tell your health care provider and ask for guidance or call [facility]'s National Helpline at 0-343-547-HELP    LEARN MORE / www.cdc.gov/drugoverdose/prescribing/guideline.html    Safely dispose of unused prescription opioids: Find your local drug take-back programs and more information about the importance of safe disposal at www.doseofreality.[place].gov             Medication List: This is a list of all your medications and when to take them. Check marks below indicate your daily home schedule. Keep this list as a reference.      Medications           Morning Afternoon Evening Bedtime As Needed    albuterol 108 (90 BASE) MCG/ACT Inhaler   Commonly known as:  PROAIR HFA/PROVENTIL HFA/VENTOLIN HFA   Inhale 2 puffs into the lungs every 6 hours                                * HYDROcodone-acetaminophen 5-325 MG per tablet   Commonly known as:  NORCO   Take 1 tablet by mouth every 6 hours as needed for pain Maximum of 4000 mg of acetaminophen in 24 hours.                                * HYDROcodone-acetaminophen 5-325 MG per tablet   Commonly known as:  NORCO   Take 1 tablet by mouth every 6 hours as needed for pain Maximum of 4000 mg of acetaminophen in 24 hours.                                IBUPROFEN PO   Take 400 mg by mouth once as needed for moderate pain                                lamoTRIgine 100 MG tablet   Commonly known as:  LaMICtal   Take 100 mg by mouth daily                                loratadine 10 MG tablet   Commonly known as:  CLARITIN   Take 10 mg by mouth daily                                * neomycin-polymyxin-dexamethasone 3.5-53914-2.1 Susp ophthalmic susp   Commonly known as:  MAXITROL   Place 1 drop into the right eye 4 times daily                                * neomycin-polymyxin-dexamethasone 3.5-61461-6.1 Susp ophthalmic susp   Commonly known as:  MAXITROL    Place 1 drop Into the left eye 4 times daily                                norgestimate-ethinyl estradiol 0.25-35 MG-MCG per tablet   Commonly known as:  ORTHO-CYCLEN, SPRINTEC   Take 1 tablet by mouth daily                                pseudoePHEDrine 30 MG tablet   Commonly known as:  SUDAFED                                XANAX PO   Take 0.5 mg by mouth once as needed for anxiety                                * Notice:  This list has 4 medication(s) that are the same as other medications prescribed for you. Read the directions carefully, and ask your doctor or other care provider to review them with you.

## 2018-03-14 NOTE — ANESTHESIA CARE TRANSFER NOTE
Patient: Yuko Arambula    Procedure(s):  Left Upper Eyelid Ptosis Repair  - Wound Class: I-Clean    Diagnosis: Myogenic Ptosis Of Left Eyelid   Diagnosis Additional Information: No value filed.    Anesthesia Type:   No value filed.     Note:  Airway :Room Air  Patient transferred to:Phase II  Comments: VSS/WNL. Responds well.Handoff Report: Identifed the Patient, Identified the Reponsible Provider, Reviewed the pertinent medical history, Discussed the surgical course, Reviewed Intra-OP anesthesia mangement and issues during anesthesia, Set expectations for post-procedure period and Allowed opportunity for questions and acknowledgement of understanding      Vitals: (Last set prior to Anesthesia Care Transfer)    CRNA VITALS  3/14/2018 0749 - 3/14/2018 0819      3/14/2018             Resp Rate (set): 10                Electronically Signed By: TIMMY Carvajal CRNA  March 14, 2018  8:19 AM

## 2018-03-15 ENCOUNTER — TELEPHONE (OUTPATIENT)
Dept: OPHTHALMOLOGY | Facility: CLINIC | Age: 28
End: 2018-03-15

## 2018-03-15 NOTE — TELEPHONE ENCOUNTER
Telephone call to Yuko Arambula    Doing well with no pain, good vision, and no bleeding. All questions were answered, she is doing well, and postoperative care was reviewed.  A postop appointment has been scheduled.    Kelsi Harvey MD

## 2018-03-16 ENCOUNTER — TELEPHONE (OUTPATIENT)
Dept: OPHTHALMOLOGY | Facility: CLINIC | Age: 28
End: 2018-03-16

## 2018-03-16 NOTE — TELEPHONE ENCOUNTER
Message from call center received stating patient needed to reschedule Post-Op.     Called patient, Post-op was rescheduled to April 2nd with Dr. Barba. Patient had no further questions.

## 2018-04-02 ENCOUNTER — OFFICE VISIT (OUTPATIENT)
Dept: OPHTHALMOLOGY | Facility: CLINIC | Age: 28
End: 2018-04-02
Payer: COMMERCIAL

## 2018-04-02 DIAGNOSIS — Z98.890 POSTOPERATIVE EYE STATE: Primary | ICD-10-CM

## 2018-04-02 ASSESSMENT — VISUAL ACUITY
METHOD: SNELLEN - LINEAR
OS_CC: 20/20
CORRECTION_TYPE: GLASSES
OD_CC: 20/20

## 2018-04-02 ASSESSMENT — SLIT LAMP EXAM - LIDS: COMMENTS: LID IN GOOD POSITION

## 2018-04-02 ASSESSMENT — TONOMETRY
IOP_METHOD: ICARE
OS_IOP_MMHG: 17
OD_IOP_MMHG: 14

## 2018-04-02 NOTE — MR AVS SNAPSHOT
After Visit Summary   4/2/2018    Yuko Arambula    MRN: [id number]           Patient Information     Date Of Birth          1990        Visit Information        Provider Department      4/2/2018 8:45 AM Leo Barba MD [facility] Ophthalmology        Today's Diagnoses     Postoperative eye state    -  1       Follow-ups after your visit        Follow-up notes from your care team     Return in about 6 weeks (around 5/14/2018), or if symptoms worsen or fail to improve.      Who to contact     Please call your clinic at [phone] to:    Ask questions about your health    Make or cancel appointments    Discuss your medicines    Learn about your test results    Speak to your doctor            Additional Information About Your Visit        [employer] Information     [employer] gives you secure access to your electronic health record. If you see a primary care provider, you can also send messages to your care team and make appointments. If you have questions, please call your primary care clinic.  If you do not have a primary care provider, please call [phone] and they will assist you.      [employer] is an electronic gateway that provides easy, online access to your medical records. With [employer], you can request a clinic appointment, read your test results, renew a prescription or communicate with your care team.     To access your existing account, please contact your [facility] or call [phone] for assistance.        Care EveryWhere ID     This is your Care EveryWhere ID. This could be used by other organizations to access your [facility] medical records  [id number]         Blood Pressure from Last 3 Encounters:   03/14/18 103/59   12/20/17 107/62   01/28/15 117/75    Weight from Last 3 Encounters:   03/14/18 59 kg (130 lb)   12/20/17 59 kg (130 lb)   09/18/17 62.6 kg (138 lb)              Today, you had the following     No orders found for display       Primary  Care Provider Fax #    Physician No Ref-Primary [phone]       No address on file        Equal Access to Services     DALE HARRISON : Hadii salud washburn nandini Garibay, wadelmerda janakradha, kamran florence, michael bogdanin hayaarambo barahonaed foster laaurorarambo quintana. So [facility] [phone].    ATENCIÓN: Si habla español, tiene a alexis disposición servicios gratuitos de asistencia lingüística. Llame al [phone].    We comply with applicable federal civil rights laws and [place] laws. We do not discriminate on the basis of race, color, national origin, age, disability, sex, sexual orientation, or gender identity.            Thank you!     Thank you for choosing [facility] OPHTHALMOLOGY  for your care. Our goal is always to provide you with excellent care. Hearing back from our patients is one way we can continue to improve our services. Please take a few minutes to complete the written survey that you may receive in the mail after your visit with us. Thank you!             Your Updated Medication List - Protect others around you: Learn how to safely use, store and throw away your medicines at www.disposemymeds.org.          This list is accurate as of 4/2/18  9:32 AM.  Always use your most recent med list.                   Brand Name Dispense Instructions for use Diagnosis    albuterol 108 (90 BASE) MCG/ACT Inhaler    PROAIR HFA/PROVENTIL HFA/VENTOLIN HFA     Inhale 2 puffs into the lungs every 6 hours        * HYDROcodone-acetaminophen 5-325 MG per tablet    NORCO    10 tablet    Take 1 tablet by mouth every 6 hours as needed for pain Maximum of 4000 mg of acetaminophen in 24 hours.    Postoperative eye state       * HYDROcodone-acetaminophen 5-325 MG per tablet    NORCO    10 tablet    Take 1 tablet by mouth every 6 hours as needed for pain Maximum of 4000 mg of acetaminophen in 24 hours.    Postoperative eye state       IBUPROFEN PO      Take 400 mg by mouth once as needed for moderate pain        lamoTRIgine 100 MG tablet     LaMICtal     Take 100 mg by mouth daily        loratadine 10 MG tablet    CLARITIN     Take 10 mg by mouth daily        * neomycin-polymyxin-dexamethasone 3.5-69684-4.1 Susp ophthalmic susp    MAXITROL    1 Bottle    Place 1 drop into the right eye 4 times daily    Postoperative eye state       * neomycin-polymyxin-dexamethasone 3.5-52315-9.1 Susp ophthalmic susp    MAXITROL    1 Bottle    Place 1 drop Into the left eye 4 times daily    Postoperative eye state       norgestimate-ethinyl estradiol 0.25-35 MG-MCG per tablet    ORTHO-CYCLEN, SPRINTEC     Take 1 tablet by mouth daily        pseudoePHEDrine 30 MG tablet    SUDAFED          XANAX PO      Take 0.5 mg by mouth once as needed for anxiety        * Notice:  This list has 4 medication(s) that are the same as other medications prescribed for you. Read the directions carefully, and ask your doctor or other care provider to review them with you.

## 2018-04-02 NOTE — PROGRESS NOTES
Yuko Arambula is 3 weeks status post left upper lid MMCR.  Doing well, no issues.  The lid(s)  are  in excellent position.    Plan:  -healing well  -Return to clinic in 6 weeks     Kelsi Harvey MD  Ophthalmic Plastic and Reconstructive Surgery Fellow    Attending Physician Attestation:  Complete documentation of historical and exam elements from today's encounter can be found in the full encounter summary report (not reduplicated in this progress note).  I personally obtained the chief complaint(s) and history of present illness.  I confirmed and edited as necessary the review of systems, past medical/surgical history, family history, social history, and examination findings as documented by others; and I examined the patient myself.  I personally reviewed the relevant tests, images, and reports as documented above.  I formulated and edited as necessary the assessment and plan and discussed the findings and management plan with the patient and family. I personally reviewed the ophthalmic test(s) associated with this encounter, agree with the interpretation(s) as documented by the resident/fellow, and have edited the corresponding report(s) as necessary.   -Leo Barba MD

## 2018-04-02 NOTE — NURSING NOTE
Chief Complaints and History of Present Illnesses   Patient presents with     Post Op (Ophthalmology) Both Eyes     POW#2 s/p JADEN ptosis repair by MMCR     HPI    Affected eye(s):  Left   Symptoms:     No blurred vision   Redness   No tearing   Itching   Burning   No eye discharge         Do you have eye pain now?:  No      Comments:    Patient notes that she feels her LLL is a little irritated and she feels it is due to the Maxitrol gtts, using QID RADHA Pucrell April 2, 2018 8:42 AM

## 2019-05-17 ENCOUNTER — HOSPITAL ENCOUNTER (EMERGENCY)
Facility: CLINIC | Age: 29
Discharge: HOME | End: 2019-05-17
Attending: EMERGENCY MEDICINE | Admitting: EMERGENCY MEDICINE
Payer: COMMERCIAL

## 2019-05-17 ENCOUNTER — APPOINTMENT (OUTPATIENT)
Dept: CT IMAGING | Facility: CLINIC | Age: 29
End: 2019-05-17
Attending: EMERGENCY MEDICINE
Payer: COMMERCIAL

## 2019-05-17 ENCOUNTER — APPOINTMENT (OUTPATIENT)
Dept: ULTRASOUND IMAGING | Facility: CLINIC | Age: 29
End: 2019-05-17
Attending: EMERGENCY MEDICINE
Payer: COMMERCIAL

## 2019-05-17 VITALS
TEMPERATURE: 98.3 F | RESPIRATION RATE: 16 BRPM | WEIGHT: 135 LBS | DIASTOLIC BLOOD PRESSURE: 68 MMHG | SYSTOLIC BLOOD PRESSURE: 117 MMHG | OXYGEN SATURATION: 97 % | BODY MASS INDEX: 23.05 KG/M2 | HEIGHT: 64 IN | HEART RATE: 72 BPM

## 2019-05-17 DIAGNOSIS — N83.8 LEFT OVARIAN ENLARGEMENT: ICD-10-CM

## 2019-05-17 DIAGNOSIS — R10.2 PELVIC PAIN IN FEMALE: ICD-10-CM

## 2019-05-17 LAB
ALBUMIN SERPL-MCNC: 4 G/DL (ref 3.4–5)
ALBUMIN UR-MCNC: NEGATIVE MG/DL
ALP SERPL-CCNC: 97 U/L (ref 40–150)
ALT SERPL W P-5'-P-CCNC: 24 U/L (ref 0–50)
ANION GAP SERPL CALCULATED.3IONS-SCNC: 6 MMOL/L (ref 3–14)
APPEARANCE UR: CLEAR
AST SERPL W P-5'-P-CCNC: 16 U/L (ref 0–45)
BASOPHILS # BLD AUTO: 0 10E9/L (ref 0–0.2)
BASOPHILS NFR BLD AUTO: 0 %
BILIRUB SERPL-MCNC: 0.4 MG/DL (ref 0.2–1.3)
BILIRUB UR QL STRIP: NEGATIVE
BUN SERPL-MCNC: 10 MG/DL (ref 7–30)
CALCIUM SERPL-MCNC: 8.8 MG/DL (ref 8.5–10.1)
CHLORIDE SERPL-SCNC: 109 MMOL/L (ref 94–109)
CO2 SERPL-SCNC: 26 MMOL/L (ref 20–32)
COLOR UR AUTO: YELLOW
CREAT SERPL-MCNC: 0.73 MG/DL (ref 0.52–1.04)
DIFFERENTIAL METHOD BLD: NORMAL
EOSINOPHIL # BLD AUTO: 0.2 10E9/L (ref 0–0.7)
EOSINOPHIL NFR BLD AUTO: 2.1 %
ERYTHROCYTE [DISTWIDTH] IN BLOOD BY AUTOMATED COUNT: 12.4 % (ref 10–15)
GFR SERPL CREATININE-BSD FRML MDRD: >90 ML/MIN/{1.73_M2}
GLUCOSE SERPL-MCNC: 92 MG/DL (ref 70–99)
GLUCOSE UR STRIP-MCNC: NEGATIVE MG/DL
HCG SERPL QL: NEGATIVE
HCT VFR BLD AUTO: 39 % (ref 35–47)
HGB BLD-MCNC: 13.3 G/DL (ref 11.7–15.7)
HGB UR QL STRIP: NEGATIVE
IMM GRANULOCYTES # BLD: 0 10E9/L (ref 0–0.4)
IMM GRANULOCYTES NFR BLD: 0.1 %
KETONES UR STRIP-MCNC: NEGATIVE MG/DL
LEUKOCYTE ESTERASE UR QL STRIP: NEGATIVE
LIPASE SERPL-CCNC: 111 U/L (ref 73–393)
LYMPHOCYTES # BLD AUTO: 1.9 10E9/L (ref 0.8–5.3)
LYMPHOCYTES NFR BLD AUTO: 22.1 %
MCH RBC QN AUTO: 30.3 PG (ref 26.5–33)
MCHC RBC AUTO-ENTMCNC: 34.1 G/DL (ref 31.5–36.5)
MCV RBC AUTO: 89 FL (ref 78–100)
MONOCYTES # BLD AUTO: 0.9 10E9/L (ref 0–1.3)
MONOCYTES NFR BLD AUTO: 10.4 %
NEUTROPHILS # BLD AUTO: 5.5 10E9/L (ref 1.6–8.3)
NEUTROPHILS NFR BLD AUTO: 65.3 %
NITRATE UR QL: NEGATIVE
NRBC # BLD AUTO: 0 10*3/UL
NRBC BLD AUTO-RTO: 0 /100
PH UR STRIP: 8 PH (ref 5–7)
PLATELET # BLD AUTO: 250 10E9/L (ref 150–450)
POTASSIUM SERPL-SCNC: 3.8 MMOL/L (ref 3.4–5.3)
PROT SERPL-MCNC: 7.4 G/DL (ref 6.8–8.8)
RBC # BLD AUTO: 4.39 10E12/L (ref 3.8–5.2)
SODIUM SERPL-SCNC: 141 MMOL/L (ref 133–144)
SOURCE: ABNORMAL
SP GR UR STRIP: 1.01 (ref 1–1.03)
UROBILINOGEN UR STRIP-MCNC: NORMAL MG/DL (ref 0–2)
WBC # BLD AUTO: 8.5 10E9/L (ref 4–11)

## 2019-05-17 PROCEDURE — 25000128 H RX IP 250 OP 636: Performed by: EMERGENCY MEDICINE

## 2019-05-17 PROCEDURE — 99243 OFF/OP CNSLTJ NEW/EST LOW 30: CPT | Performed by: OBSTETRICS & GYNECOLOGY

## 2019-05-17 PROCEDURE — 81003 URINALYSIS AUTO W/O SCOPE: CPT | Performed by: EMERGENCY MEDICINE

## 2019-05-17 PROCEDURE — 85025 COMPLETE CBC W/AUTO DIFF WBC: CPT | Performed by: EMERGENCY MEDICINE

## 2019-05-17 PROCEDURE — 74176 CT ABD & PELVIS W/O CONTRAST: CPT

## 2019-05-17 PROCEDURE — 96361 HYDRATE IV INFUSION ADD-ON: CPT

## 2019-05-17 PROCEDURE — 80053 COMPREHEN METABOLIC PANEL: CPT | Performed by: EMERGENCY MEDICINE

## 2019-05-17 PROCEDURE — 96374 THER/PROPH/DIAG INJ IV PUSH: CPT

## 2019-05-17 PROCEDURE — 84703 CHORIONIC GONADOTROPIN ASSAY: CPT | Performed by: EMERGENCY MEDICINE

## 2019-05-17 PROCEDURE — 99285 EMERGENCY DEPT VISIT HI MDM: CPT | Mod: 25

## 2019-05-17 PROCEDURE — 93976 VASCULAR STUDY: CPT

## 2019-05-17 PROCEDURE — 83690 ASSAY OF LIPASE: CPT | Performed by: EMERGENCY MEDICINE

## 2019-05-17 RX ORDER — KETOROLAC TROMETHAMINE 15 MG/ML
15 INJECTION, SOLUTION INTRAMUSCULAR; INTRAVENOUS ONCE
Status: COMPLETED | OUTPATIENT
Start: 2019-05-17 | End: 2019-05-17

## 2019-05-17 RX ORDER — HYDROMORPHONE HYDROCHLORIDE 1 MG/ML
0.5 INJECTION, SOLUTION INTRAMUSCULAR; INTRAVENOUS; SUBCUTANEOUS
Status: DISCONTINUED | OUTPATIENT
Start: 2019-05-17 | End: 2019-05-17 | Stop reason: HOSPADM

## 2019-05-17 RX ADMIN — KETOROLAC TROMETHAMINE 15 MG: 15 INJECTION, SOLUTION INTRAMUSCULAR; INTRAVENOUS at 15:05

## 2019-05-17 RX ADMIN — SODIUM CHLORIDE 1000 ML: 9 INJECTION, SOLUTION INTRAVENOUS at 15:52

## 2019-05-17 ASSESSMENT — ENCOUNTER SYMPTOMS
BACK PAIN: 0
DYSURIA: 0
FREQUENCY: 0
ABDOMINAL PAIN: 1
HEMATURIA: 0

## 2019-05-17 ASSESSMENT — MIFFLIN-ST. JEOR: SCORE: 1332.36

## 2019-05-17 NOTE — ED AVS SNAPSHOT
Emergency Department  [address] [place] [id number]  Phone:  [phone]  Fax:  [phone]                                    Yuko Pickett   MRN: [id number]    Department:   Emergency Department   Date of Visit:  5/17/2019           After Visit Summary Signature Page    I have received my discharge instructions, and my questions have been answered. I have discussed any challenges I see with this plan with the nurse or doctor.    ..........................................................................................................................................  Patient/Patient Representative Signature      ..........................................................................................................................................  Patient Representative Print Name and Relationship to Patient    ..................................................               ................................................  Date                                   Time    ..........................................................................................................................................  Reviewed by Signature/Title    ...................................................              ..............................................  Date                                               Time          22EPIC Rev 08/18

## 2019-05-17 NOTE — CONSULTS
GYN ED CONSULT NOTE    Reason for consult: possible ovarian torsion    HPI: 30yo G0 unassigned pt who presented to the ER this after following sudden onset of midline pelvic cramping that does go into the vagina. Reports baseline cramping pain with spikes in pain periodically. Has not had this before. Perhaps has had some vaginal pain in the past which she describes as intense pressure but was not to the degree is experiencing pain today. Pain hasstayed relatively stable during her time in the ER, somewhat improved with Toradol. Denies nausea/vomiting, fever, VB, discharge. No urinary symptoms. Has been somewhat constipated last couple days, drank coffee this am and had small BM.   Is a pharmacy student with hx anxiety. Has been under a lot of stress lately, not sure if this is worsening her symptoms or not.   Is in monogamous  relationship.  Has an progesterone containing IUD for last 3yrs. Does not get periods or spotting.   Is supposed to be going on a trip via airplane tomorrow.   Is not certain desires children.       Past Medical History:   Diagnosis Date     Anxiety      Depression      Uncomplicated asthma      Past Surgical History:   Procedure Laterality Date     REPAIR PTOSIS Right 12/20/2017    Procedure: REPAIR PTOSIS;  Right Upper Lid Ptosis Repair;  Surgeon: Leo Barba MD;  Location:  OR     REPAIR PTOSIS Left 3/14/2018    Procedure: REPAIR PTOSIS;  Left Upper Eyelid Ptosis Repair ;  Surgeon: Leo Barba MD;  Location:  OR     Current Facility-Administered Medications   Medication     HYDROmorphone (PF) (DILAUDID) injection 0.5 mg     Current Outpatient Medications   Medication     albuterol (PROAIR HFA, PROVENTIL HFA, VENTOLIN HFA) 108 (90 BASE) MCG/ACT inhaler     ALPRAZolam (XANAX PO)     HYDROcodone-acetaminophen (NORCO) 5-325 MG per tablet     HYDROcodone-acetaminophen (NORCO) 5-325 MG per tablet     IBUPROFEN PO     lamoTRIgine (LAMICTAL) 100 MG tablet     loratadine  "(CLARITIN) 10 MG tablet     neomycin-polymyxin-dexamethasone (MAXITROL) 3.5-53405-6.1 SUSP ophthalmic susp     neomycin-polymyxin-dexamethasone (MAXITROL) 3.5-31433-1.1 SUSP ophthalmic susp     norgestimate-ethinyl estradiol (ORTHO-CYCLEN, SPRINTEC) 0.25-35 MG-MCG tablet     pseudoePHEDrine (SUDAFED) 30 MG tablet        Allergies   Allergen Reactions     Dulera      Penicillins Rash     Social History     Socioeconomic History     Marital status: Single     Spouse name: Not on file     Number of children: Not on file     Years of education: Not on file     Highest education level: Not on file   Occupational History     Not on file   Social Needs     Financial resource strain: Not on file     Food insecurity:     Worry: Not on file     Inability: Not on file     Transportation needs:     Medical: Not on file     Non-medical: Not on file   Tobacco Use     Smoking status: Never Smoker     Smokeless tobacco: Never Used   Substance and Sexual Activity     Alcohol use: Yes     Drug use: Yes     Types: Marijuana     Sexual activity: Never   Lifestyle     Physical activity:     Days per week: Not on file     Minutes per session: Not on file     Stress: Not on file   Relationships     Social connections:     Talks on phone: Not on file     Gets together: Not on file     Attends Jain service: Not on file     Active member of club or organization: Not on file     Attends meetings of clubs or organizations: Not on file     Relationship status: Not on file     Intimate partner violence:     Fear of current or ex partner: Not on file     Emotionally abused: Not on file     Physically abused: Not on file     Forced sexual activity: Not on file   Other Topics Concern     Not on file   Social History Narrative     Not on file     O:  Vitals:    05/17/19 1433 05/17/19 1600   BP: 118/70    Pulse: 105 72   Resp: 20 16   Temp: 98.3  F (36.8  C)    TempSrc: Oral    SpO2: 98% 97%   Weight: 61.2 kg (135 lb)    Height: 1.626 m (5' 4\")  "     Gen: AOx3, NAD  Chest: nonlabored breathing  Abd: soft, ND, midline pelvic TTP, no rebound/guard, no peritoneal signs.  Ext: no calf ttp, no edema  Pelvis: Per ED physician, bimanual showed bilateral diffuse tenderness. Exam not repeated    Labs:   Reviewed. Unremarkable.     Imaging:   Reviewed.     Pelvic US:  IMPRESSION: Mildly prominent left ovary compared to the right. IUD in  good position. Flow is identified in the ovaries. Clinical  Correlation.    Abd/Pelvic CT:  IMPRESSION:   1. No renal or ureteral calculi or evidence of urinary obstruction.  2. No other cause of acute pain identified in the abdomen or pelvis      A/P: 28yo G0 who presented to ER with sudden onset midline pelvic cramping.   -Reviewed ED workup and findings with pt and . US significant for 1cm enlargement of left ovary with normal blood flow and no other findings. Discussed no obvious explanation of her symptoms based on her workup. Symptoms by description appear to be uterine in nature with midline ttp on exam, non acute abdomen. IUD is in proper location. Discussed the diagnosis of ovarian torsion can be a difficult one of exclusion of other abnormalities. At this point, with the slight enlargement of the left ovary-an inability to determine if this is normal for her or not-the other concern would be of her fertility and the health and vitality of the left ovary. The only way to exclude torsion would be for laparoscopy. Discussed the procedure of a laparoscopy and anesthesia required with associated recovery time. Travel tomorrow would not be recommended. Discussed that if torsion is present and untreated, it could lead to decline in ovarian quality, adverse affect on fertility due to loss of blood flow to the ovary, pain could be ongoing, and necrosis of the ovary could result. Pt given time to discuss with her . Ultimately she decided that she does not want to proceed with laparoscopy. States she is ok with the  potential decline of fertility due to loss of the ovary, as she is not certain she wants children. Is aware that adoption and IVF-using the good ovary on the right, is yet possible. Did discuss assumption of quality of the right ovary but inability to prove this at this time. States she is comfortable with monitoring the pain. Discussed treatment with ibuprofen, Norco-which she has a new, unused and unexpired prescription at home. Discussed heating pad. Discussed that if pain increases, symptoms change, new symptoms develop or if she changes her mind then she should seek medical evaluation. Discussed that this could impact how well she feels on her vacation if chooses to go tomorrow.   Questions answered. Pt and  happy with plan.    (25 minutes was spent face to face with the patient today discussing her history, diagnosis, and follow-up plan as noted above. Over 50% of the visit was spent in counseling and coordination of care.)      Ca Campbell Masters, DO

## 2019-05-17 NOTE — ED PROVIDER NOTES
"  History     Chief Complaint:  Abdominal Pain & Pelvic Pain      HPI   Yuko Pickett is a 29 year old female who presents with abdominal pain and pelvic pain. The patient reports that the pain started about 40 minutes ago in the car before arrival. She describes the pain in the area of her lower abdomen near her uterus. It does not radiate to the sides. It came on suddenly and intensely. Pain is 10/10 when it comes on. She has not experienced pain like this before. She reports that when she urinated here she felt a pressure in this area. She notes UTI in the past, but is unlike the pain today. She states a fever yesterday, with no recorded temperature. Denies back pain, issues with urination, or vaginal bleeding. No history of bladder infection, kidney stones, or ovarian cysts.      Allergies:  Dulera  Penicillins       Medications:    Albuterol inhaler  Xanax  Norco  Lamictal  Claritin  Sudefed       Past Medical History:    Anxiety  Depression      Past Surgical History:    Repair ptosis      Family History:    History reviewed. No pertinent family history.       Social History:  Smoking status: Never smoker  Alcohol use: Yes  Drug use: Yes  Presents to the ED with her significant other  Marital Status:  Single [1]       Review of Systems   Gastrointestinal: Positive for abdominal pain.   Genitourinary: Positive for pelvic pain. Negative for dysuria, frequency, hematuria, urgency and vaginal bleeding.   Musculoskeletal: Negative for back pain.   10 point review of systems performed and is negative except as above and in HPI      Physical Exam     Patient Vitals for the past 24 hrs:   BP Temp Temp src Pulse Resp SpO2 Height Weight   05/17/19 1433 118/70 98.3  F (36.8  C) Oral 105 20 98 % 1.626 m (5' 4\") 61.2 kg (135 lb)       Physical Exam  Nursing note and vitals reviewed.  Constitutional:  Appears well-developed and well-nourished. Uncomfortable. Leaning forward and holding lower abdomen.  HENT:   Head: "    Atraumatic.   Mouth/Throat:   Oropharynx is clear and moist. No oropharyngeal exudate.   Eyes:    Pupils are equal, round, and reactive to light.   Neck:    Normal range of motion. Neck supple.      No tracheal deviation present. No thyromegaly present.   Cardiovascular:  Normal rate, regular rhythm, no murmur   Pulmonary/Chest: Breath sounds are clear and equal without wheezes or crackles.  Abdominal:   Soft. Bowel sounds are normal. Exhibits no distension. Tenderness across lower abdomen with guarding but no rebound. No mass.  Bimanual Pelvic:         IUD strings palpable.  NO CMT.  Mild bilateral adnexal tenderness without mass.   Back:    No CVA tenderness.  Musculoskeletal:  Exhibits no edema.   Lymphadenopathy:  No cervical adenopathy.   Neurological:   Alert and oriented to person, place, and time.   Skin:    Skin is warm and dry. No rash noted. No pallor.     Emergency Department Course   Imaging:  Radiographic findings were communicated with the patient who voiced understanding of the findings.    US Pelvic Complete w Transvaginal & Abdomen/Pelvis Duplex Limited  Mildly prominent left ovary compared to the right. IUD in  good position. Flow is identified in the ovaries. Clinical  Correlation.  As read by Radiology.    CT Abdomen Pelvis w/o Contrast  1. No renal or ureteral calculi or evidence of urinary obstruction.  2. No other cause of acute pain identified in the abdomen or pelvis  As read by Radiology.      Laboratory:  CBC: WNL (WBC 8.5, HGB 13.3, )  HCG Qualitative pregnancy blood: Negative  Lipase: 111  CMP: (Creatinine 0.73)    UA reflex to Microscopic and Culture: pH 8.0 (H)      Interventions:  1505: 15 mg Toradol IV  1552: NS 1L IV Bolus      Emergency Department Course:  Past medical records, nursing notes, and vitals reviewed.  1459: I performed an exam of the patient and obtained history, as documented above.    IV inserted and blood drawn.    The patient was sent for a pelvic  ultrasound and abdomen/pelvic CT while in the emergency department, findings above.    1607: I rechecked the patient. Explained findings to patient.    1619: I spoke to Dr. Sung on-call for obstetrics and gynecology.     1625: I rechecked the patient. Explained findings to patient.    1829: I rechecked the patient. Findings and plan explained to the patient. Patient discharged home with instructions regarding supportive care, medications, and reasons to return. The importance of close follow-up was reviewed.     Impression & Plan    Medical Decision Making:  Yuko Pickett is a 29 year old female who presents with abdominal pain and pelvic pain. I found the patient to have acute pelvic pain without clear etiology. I was initially concerned about the possibility of ovarian torsion as the cause of her pain, since it was sudden onset and severe, she was sent emergently for pelvic ultrasound. There was good blood flow to both ovaries, however the left ovary was slightly larger than the right ovary. Because the left ovary is slightly larger than right and she is having significant pain without any other explanation for pain and her CT is normal with no sign of kidney stone or cause, I consulted OB/Gyn and spoke with Dr. Sung who saw the patient in the emergency department and discussed the options of exploratory laparotomy or conservative management. The patient did not want to go to the operating room. The risks and benefits of this were discussed by Dr. Sung with the patient, including ovary torsion and death of the ovary with potential future fertility issues. The patient still did not want surgery so she was discharged to home at the recommendation of Dr. Sung. The patient has Vicodin at home and was told to take ibuprofen and return if she changes her mind and wants surgery, or for any other concerns. Told to follow up with Dr. Sung this week.     Her IUD is in good position and she is not having  cervical motion tenderness to suggest infection. There was no sign of pregnancy or UTI      Diagnosis:    ICD-10-CM   1. Pelvic pain in female R10.2   2. Left ovarian enlargement N83.8       Disposition: Discharged to home    Discharge Medications: None    Iris GOMEZ, am serving as a scribe at 2:59 PM on 5/17/2019 to document services personally performed by Connie Roe MD based on my observations and the provider's statements to me.       Iris Wynne  5/17/2019    EMERGENCY DEPARTMENT       Connie Roe MD  05/17/19 [id number]

## 2019-07-15 ENCOUNTER — TELEPHONE (OUTPATIENT)
Dept: OPHTHALMOLOGY | Facility: CLINIC | Age: 29
End: 2019-07-15

## 2019-07-19 ENCOUNTER — TELEPHONE (OUTPATIENT)
Dept: OPHTHALMOLOGY | Facility: CLINIC | Age: 29
End: 2019-07-19

## 2019-07-19 ENCOUNTER — OFFICE VISIT (OUTPATIENT)
Dept: OPHTHALMOLOGY | Facility: CLINIC | Age: 29
End: 2019-07-19
Payer: COMMERCIAL

## 2019-07-19 DIAGNOSIS — H02.421 MYOGENIC PTOSIS OF RIGHT EYELID: Primary | ICD-10-CM

## 2019-07-19 RX ORDER — LAMOTRIGINE 150 MG/1
TABLET ORAL
COMMUNITY
Start: 2019-01-02 | End: 2020-02-20

## 2019-07-19 ASSESSMENT — VISUAL ACUITY
METHOD: SNELLEN - LINEAR
OS_CC: 20/20
OD_CC+: -2
OD_CC: 20/20
CORRECTION_TYPE: GLASSES
OS_CC+: -1

## 2019-07-19 ASSESSMENT — TONOMETRY
IOP_METHOD: ICARE
OD_IOP_MMHG: 12
OS_IOP_MMHG: 11

## 2019-07-19 ASSESSMENT — PATIENT HEALTH QUESTIONNAIRE - PHQ9
SUM OF ALL RESPONSES TO PHQ QUESTIONS 1-9: 13
SUM OF ALL RESPONSES TO PHQ QUESTIONS 1-9: 13

## 2019-07-19 ASSESSMENT — EXTERNAL EXAM - LEFT EYE: OS_EXAM: NORMAL

## 2019-07-19 ASSESSMENT — EXTERNAL EXAM - RIGHT EYE: OD_EXAM: NORMAL

## 2019-07-19 ASSESSMENT — CONF VISUAL FIELD
METHOD: COUNTING FINGERS
OS_NORMAL: 1
OD_NORMAL: 1

## 2019-07-19 ASSESSMENT — SLIT LAMP EXAM - LIDS: COMMENTS: 1+ PTOSIS

## 2019-07-19 NOTE — NURSING NOTE
Chief Complaints and History of Present Illnesses   Patient presents with     Droopy Eye Lid Follow-Up     Chief Complaint(s) and History of Present Illness(es)     Droopy Eye Lid Follow-Up     Laterality: right upper lid    Severity: moderate    Duration: months    Course: gradually worsening    Treatments tried: eye drops    Pain scale: 0/10              Comments     Pt here for return of droopy right upper lid. Feels that it is constant. No eye drops.    S/p left upper eyelid ptosis repair by the Raines's muscle conjunctival resection on 3/14/2018.  S/p Right upper eyelid ptosis repair by Raines's muscle conjunctival resection on 12/20/2017.    Homar Wagner COT 7:56 AM July 19, 2019

## 2019-07-19 NOTE — TELEPHONE ENCOUNTER
Met with patient to schedule surgery with Dr. Leo Barba.    Surgery was scheduled on 08/21 at [facility]  Patient will have H&P at [facility]  Post-Op care appointment was scheduled on 09/09  Patient is aware a / is needed day of surgery.   Patient received surgery packet has my direct contact information for any further questions.

## 2019-07-19 NOTE — PROGRESS NOTES
Chief Complaints and History of Present Illnesses   Patient presents with     Droopy Eye Lid Follow-Up     Chief Complaint(s) and History of Present Illness(es)     Droopy Eye Lid Follow-Up     In right upper lid.  Severity is moderate.  Duration of months.  Since   onset it is gradually worsening.  Treatments tried include eye drops.    Pain was noted as 0/10.              Comments     Pt here for return of droopy right upper lid. Feels that it is constant.   No eye drops.    S/p left upper eyelid ptosis repair by the Raines's muscle conjunctival   resection on 3/14/2018.  S/p Right upper eyelid ptosis repair by Raines's muscle conjunctival   resection on 12/20/2017.    Homar Wagnre COT 7:56 AM July 19, 2019                      Assessment & Plan     Yuko Pickett is a 29 year old female with the following diagnoses:   1. Myogenic ptosis of right eyelid       FUNCTIONAL COMPLAINTS RELATED TO DROOPY EYELIDS/BROWS:  Yuko Pickett describes upper lid interfering with superior visual field and interfering with activities of daily living including reading, driving and watching television.     EXAM:   Dominant eye right eye     MRD1: Right eye 2.5  Left eye 4      VISUAL FIELD:  Right eye untaped:25 degrees Right eye taped:50 degrees  Right eye visual field improves by: 25 degrees      PLAN:    Right upper lid Fasanella Servat (2mm)            Attending Physician Attestation:  Complete documentation of historical and exam elements from today's encounter can be found in the full encounter summary report (not reduplicated in this progress note).  I personally obtained the chief complaint(s) and history of present illness.  I confirmed and edited as necessary the review of systems, past medical/surgical history, family history, social history, and examination findings as documented by others; and I examined the patient myself.  I personally reviewed the relevant tests, images, and reports as documented above.  I  formulated and edited as necessary the assessment and plan and discussed the findings and management plan with the patient and family. I personally reviewed the ophthalmic test(s) associated with this encounter, agree with the interpretation(s) as documented by the resident/fellow, and have edited the corresponding report(s) as necessary.   -Leo Barba MD  8:08 AM 7/19/2019

## 2019-07-20 ASSESSMENT — PATIENT HEALTH QUESTIONNAIRE - PHQ9: SUM OF ALL RESPONSES TO PHQ QUESTIONS 1-9: 13

## 2019-08-18 RX ORDER — HYDROCODONE BITARTRATE AND ACETAMINOPHEN 5; 325 MG/1; MG/1
1 TABLET ORAL EVERY 4 HOURS PRN
Qty: 10 TABLET | Refills: 0 | Status: CANCELLED | OUTPATIENT
Start: 2019-08-18

## 2019-08-20 ENCOUNTER — ANESTHESIA EVENT (OUTPATIENT)
Dept: SURGERY | Facility: AMBULATORY SURGERY CENTER | Age: 29
End: 2019-08-20

## 2019-08-21 ENCOUNTER — ANESTHESIA (OUTPATIENT)
Dept: SURGERY | Facility: AMBULATORY SURGERY CENTER | Age: 29
End: 2019-08-21

## 2019-08-21 ENCOUNTER — HOSPITAL ENCOUNTER (OUTPATIENT)
Facility: AMBULATORY SURGERY CENTER | Age: 29
End: 2019-08-21
Attending: OPHTHALMOLOGY
Payer: COMMERCIAL

## 2019-08-21 VITALS
DIASTOLIC BLOOD PRESSURE: 67 MMHG | HEIGHT: 64 IN | SYSTOLIC BLOOD PRESSURE: 105 MMHG | TEMPERATURE: 98 F | WEIGHT: 135 LBS | OXYGEN SATURATION: 98 % | BODY MASS INDEX: 23.05 KG/M2 | RESPIRATION RATE: 16 BRPM

## 2019-08-21 DIAGNOSIS — Z98.890 POSTOPERATIVE EYE STATE: Primary | ICD-10-CM

## 2019-08-21 LAB
HCG UR QL: NEGATIVE
INTERNAL QC OK POCT: YES

## 2019-08-21 RX ORDER — SODIUM CHLORIDE, SODIUM LACTATE, POTASSIUM CHLORIDE, CALCIUM CHLORIDE 600; 310; 30; 20 MG/100ML; MG/100ML; MG/100ML; MG/100ML
INJECTION, SOLUTION INTRAVENOUS CONTINUOUS
Status: DISCONTINUED | OUTPATIENT
Start: 2019-08-21 | End: 2019-08-22 | Stop reason: HOSPADM

## 2019-08-21 RX ORDER — ERYTHROMYCIN 5 MG/G
0.5 OINTMENT OPHTHALMIC 3 TIMES DAILY
Qty: 3.5 G | Refills: 0 | Status: SHIPPED | OUTPATIENT
Start: 2019-08-21 | End: 2019-09-09

## 2019-08-21 RX ORDER — LIDOCAINE 40 MG/G
CREAM TOPICAL
Status: DISCONTINUED | OUTPATIENT
Start: 2019-08-21 | End: 2019-08-21 | Stop reason: HOSPADM

## 2019-08-21 RX ORDER — GABAPENTIN 300 MG/1
300 CAPSULE ORAL ONCE
Status: COMPLETED | OUTPATIENT
Start: 2019-08-21 | End: 2019-08-21

## 2019-08-21 RX ORDER — ONDANSETRON 2 MG/ML
4 INJECTION INTRAMUSCULAR; INTRAVENOUS EVERY 30 MIN PRN
Status: DISCONTINUED | OUTPATIENT
Start: 2019-08-21 | End: 2019-08-22 | Stop reason: HOSPADM

## 2019-08-21 RX ORDER — ACETAMINOPHEN 325 MG/1
975 TABLET ORAL ONCE
Status: COMPLETED | OUTPATIENT
Start: 2019-08-21 | End: 2019-08-21

## 2019-08-21 RX ORDER — PROPOFOL 10 MG/ML
INJECTION, EMULSION INTRAVENOUS PRN
Status: DISCONTINUED | OUTPATIENT
Start: 2019-08-21 | End: 2019-08-21

## 2019-08-21 RX ORDER — OXYCODONE HYDROCHLORIDE 5 MG/1
5 TABLET ORAL EVERY 4 HOURS PRN
Status: DISCONTINUED | OUTPATIENT
Start: 2019-08-21 | End: 2019-08-22 | Stop reason: HOSPADM

## 2019-08-21 RX ORDER — PROPOFOL 10 MG/ML
INJECTION, EMULSION INTRAVENOUS CONTINUOUS PRN
Status: DISCONTINUED | OUTPATIENT
Start: 2019-08-21 | End: 2019-08-21

## 2019-08-21 RX ORDER — NEOMYCIN SULFATE, POLYMYXIN B SULFATE AND DEXAMETHASONE 3.5; 10000; 1 MG/ML; [USP'U]/ML; MG/ML
1 SUSPENSION/ DROPS OPHTHALMIC 4 TIMES DAILY
Qty: 5 ML | Refills: 0 | Status: SHIPPED | OUTPATIENT
Start: 2019-08-21 | End: 2019-09-09

## 2019-08-21 RX ORDER — FENTANYL CITRATE 50 UG/ML
INJECTION, SOLUTION INTRAMUSCULAR; INTRAVENOUS PRN
Status: DISCONTINUED | OUTPATIENT
Start: 2019-08-21 | End: 2019-08-21

## 2019-08-21 RX ORDER — FENTANYL CITRATE 50 UG/ML
25-50 INJECTION, SOLUTION INTRAMUSCULAR; INTRAVENOUS EVERY 5 MIN PRN
Status: DISCONTINUED | OUTPATIENT
Start: 2019-08-21 | End: 2019-08-21 | Stop reason: HOSPADM

## 2019-08-21 RX ORDER — SODIUM CHLORIDE, SODIUM LACTATE, POTASSIUM CHLORIDE, CALCIUM CHLORIDE 600; 310; 30; 20 MG/100ML; MG/100ML; MG/100ML; MG/100ML
INJECTION, SOLUTION INTRAVENOUS CONTINUOUS
Status: DISCONTINUED | OUTPATIENT
Start: 2019-08-21 | End: 2019-08-21 | Stop reason: HOSPADM

## 2019-08-21 RX ORDER — BUPROPION HYDROCHLORIDE 150 MG/1
150 TABLET ORAL EVERY MORNING
COMMUNITY

## 2019-08-21 RX ORDER — LIDOCAINE HYDROCHLORIDE 20 MG/ML
INJECTION, SOLUTION INFILTRATION; PERINEURAL PRN
Status: DISCONTINUED | OUTPATIENT
Start: 2019-08-21 | End: 2019-08-21

## 2019-08-21 RX ORDER — MEPERIDINE HYDROCHLORIDE 25 MG/ML
12.5 INJECTION INTRAMUSCULAR; INTRAVENOUS; SUBCUTANEOUS
Status: DISCONTINUED | OUTPATIENT
Start: 2019-08-21 | End: 2019-08-22 | Stop reason: HOSPADM

## 2019-08-21 RX ORDER — NALOXONE HYDROCHLORIDE 0.4 MG/ML
.1-.4 INJECTION, SOLUTION INTRAMUSCULAR; INTRAVENOUS; SUBCUTANEOUS
Status: DISCONTINUED | OUTPATIENT
Start: 2019-08-21 | End: 2019-08-22 | Stop reason: HOSPADM

## 2019-08-21 RX ORDER — ERYTHROMYCIN 5 MG/G
OINTMENT OPHTHALMIC PRN
Status: DISCONTINUED | OUTPATIENT
Start: 2019-08-21 | End: 2019-08-21 | Stop reason: HOSPADM

## 2019-08-21 RX ORDER — LIDOCAINE HYDROCHLORIDE AND EPINEPHRINE 10; 10 MG/ML; UG/ML
INJECTION, SOLUTION INFILTRATION; PERINEURAL PRN
Status: DISCONTINUED | OUTPATIENT
Start: 2019-08-21 | End: 2019-08-21 | Stop reason: HOSPADM

## 2019-08-21 RX ORDER — ONDANSETRON 4 MG/1
4 TABLET, ORALLY DISINTEGRATING ORAL EVERY 30 MIN PRN
Status: DISCONTINUED | OUTPATIENT
Start: 2019-08-21 | End: 2019-08-22 | Stop reason: HOSPADM

## 2019-08-21 RX ORDER — TETRACAINE HYDROCHLORIDE 5 MG/ML
SOLUTION OPHTHALMIC PRN
Status: DISCONTINUED | OUTPATIENT
Start: 2019-08-21 | End: 2019-08-21 | Stop reason: HOSPADM

## 2019-08-21 RX ADMIN — SODIUM CHLORIDE, SODIUM LACTATE, POTASSIUM CHLORIDE, CALCIUM CHLORIDE: 600; 310; 30; 20 INJECTION, SOLUTION INTRAVENOUS at 09:21

## 2019-08-21 RX ADMIN — PROPOFOL 100 MG: 10 INJECTION, EMULSION INTRAVENOUS at 09:42

## 2019-08-21 RX ADMIN — PROPOFOL 100 MCG/KG/MIN: 10 INJECTION, EMULSION INTRAVENOUS at 09:42

## 2019-08-21 RX ADMIN — LIDOCAINE HYDROCHLORIDE 60 MG: 20 INJECTION, SOLUTION INFILTRATION; PERINEURAL at 09:42

## 2019-08-21 RX ADMIN — FENTANYL CITRATE 25 MCG: 50 INJECTION, SOLUTION INTRAMUSCULAR; INTRAVENOUS at 09:48

## 2019-08-21 RX ADMIN — ACETAMINOPHEN 975 MG: 325 TABLET ORAL at 09:10

## 2019-08-21 RX ADMIN — GABAPENTIN 300 MG: 300 CAPSULE ORAL at 09:10

## 2019-08-21 ASSESSMENT — MIFFLIN-ST. JEOR: SCORE: 1332.36

## 2019-08-21 NOTE — OP NOTE
PREOPERATIVE DIAGNOSIS: Right upper eyelid ptosis.   POSTOPERATIVE DIAGNOSIS:  Right upper eyelid ptosis.   PROCEDURE:Right upper eyelid ptosis repair by Fasanella- Servat internal tarsectomy.   SURGEON: Ruy Barba MD  ASSISTANT:Ximena Kerr MD and Moody Cobos MD   ANESTHESIA: Monitored with local infiltration of 1% lidocaine with epinephrine [id number].   ESTIMATED BLOOD LOSS: Less than 5 cc.   HISTORY: Yuko Pickett presented with upper lid ptosis interfering with the superior visual field and activities of daily living. After the risks, benefits and alternatives to the proposed procedure were explained, informed consent was obtained.   PROCEDURE: The patient was brought to the operating room and placed supine on the operating table. IV sedation was given. The right upper eyelid was infiltrated with local anesthetic and  was prepped and draped in the typical sterile ophthalmic fashion. Atttention was directed to the right  side.  A 4-0 silk suture was placed through the eyelid margin and the eyelid everted over a Desmarres retractor.  6-0 silk traction sutures were placed medially and laterally through the tarsus. A marking was made on the central tarsus 2 mm from the superior tarsal border.  The Putterman clamp was used and clamped with the central marking at the edge of the clamp. A 6-0 plain gut suture was run in a horizontal mattress fashion 1 mm below the clamp from lateral to medial, then medial to lateral. The elevated tissues were excised with a 15 blade. The sutures were then externalized and tied in the lid crease. The 4-0 silk suture was removed. The lid was reverted to its normal position and ophthalmic antibiotic ointment placed in the eye.The patient tolerated the procedure well and left the operating room in stable condition.     RUY BARBA MD

## 2019-08-21 NOTE — ANESTHESIA POSTPROCEDURE EVALUATION
Anesthesia POST Procedure Evaluation    Patient: Yuko Pickett   MRN:     [id number] Gender:   female   Age:    29 year old :      1990        Preoperative Diagnosis: Ptosis   Procedure(s):  Right Upper Lid Ptosis Repair   Postop Comments: No value filed.       Anesthesia Type:  Not documented  MAC    Reportable Event: NO     PAIN: Uncomplicated   Sign Out status: Comfortable, Well controlled pain     PONV: No PONV   Sign Out status:  No Nausea or Vomiting     Neuro/Psych: Uneventful perioperative course   Sign Out Status: Preoperative baseline; Age appropriate mentation     Airway/Resp.: Uneventful perioperative course   Sign Out Status: Non labored breathing, age appropriate RR; Resp. Status within EXPECTED Parameters     CV: Uneventful perioperative course   Sign Out status: Appropriate BP and perfusion indices; Appropriate HR/Rhythm     Disposition:   Sign Out in:  PACU  Disposition:  Phase II; Home  Recovery Course: Uneventful  Follow-Up: Not required           Last Anesthesia Record Vitals:  CRNA VITALS  2019 0932 - 2019 1032      2019             Pulse:  89    SpO2:  96 %    Resp Rate (observed):  16    Resp Rate (set):  10    EKG:  Sinus rhythm          Last PACU Vitals:  Vitals Value Taken Time   /65 2019 10:05 AM   Temp 36.7  C (98  F) 2019 10:05 AM   Pulse     Resp 16 2019 10:05 AM   SpO2 97 % 2019 10:05 AM   Temp src     NIBP 102/64 2019 10:01 AM   Pulse 89 2019 10:02 AM   SpO2 96 % 2019 10:02 AM   Resp     Temp     Ht Rate 86 2019 10:01 AM   Temp 2           Electronically Signed By: Deepak Sepulveda MD, MD, 2019, 10:49 AM

## 2019-08-21 NOTE — DISCHARGE INSTRUCTIONS
Post-operative Instructions    Ophthalmic Plastic and Reconstructive Surgery  Leo Barba M.D.  Ximena Kerr M.D.    All instructions apply to the operated eye(s) or eyelid(s)    What to expect after surgery:    There will be some swelling, bruising, and likely a black eye (even into the lower eyelids and cheeks). Also expect crusting and discharge from the eye and/or incisions.     A small amount of surface bleeding is normal for the first 48 hours after surgery.    You may notice some bloody tears for the first few days after surgery. This is normal.    Your eye(s) and eyelid(s) may be painful and tender. This is normal after surgery. Use the pain medication as prescribed. If your pain does not improve despite the medication, contact the office.    Wound care and personal care:    If a patch or bandage has been placed, please leave this in place until seen in clinic. Prevent the bandage from getting wet.     Apply ice compresses 15 minutes on 15 minutes off while awake for the first 2 days after surgery, then switch to warm compresses 4 times a day until seen by your physician.     For warm packs you can place a cup of dry uncooked rice in a clean cotton sock. Place sock in microwave 30 seconds to one minute. Next place the warm sock into a plastic bag and wrap the bag with clean warm wet washcloth and place over operated eye.      You may shower or wash your hair the day after surgery. Do not bathe or go swimming for 1 week to prevent contamination of your wounds.    Do not apply make-up to the eyes or eyelids for 2 weeks after surgery.    Activity restrictions and driving:    Avoid heavy lifting, bending, exercise or strenuous activity for 1 week after surgery.    You may resume other activities and return to work as tolerated.    You may not resume driving until have you stopped using narcotic pain medications(such as Norco, Percocet, Tylenol #3).    Medications:    Restart all your regular home  medications and eye drops today. If you take Plavix or Aspirin on a regular basis, wait for 3 days after your surgery before restarting these in order to decrease the risk of bleeding complications.    Avoid aspirin and aspirin-like medications (Motrin, Aleve, Ibuprofen, Tasha-Wagoner etc) for 5 days to reduce the risk of bleeding. You may take Tylenol (acetaminophen) for pain.    In addition to your home medications, take the following post-operative medications as prescribed by your physician:    Apply antibiotic ointment (erythromycin) to sutures three times a day, and into the operated eye(s) at night.     Instill eye drops (Maxitrol) four times a day until the bottle finished.     Take 1 to 2 pain pills (norco or tylenol 3 as prescribed) as needed for pain up to every 4 hours.    The pain pills may make you drowsy. You must not drive a car, operate heavy machinery or drink alcohol while taking them.    The pain pills may cause constipation and nausea. Take them with some food to prevent a stomach upset. If you continue to experience nausea, call your physician.      WARNING: All the prescription pain medications listed above contain Tylenol (acetaminophen). You must not take more than 4,000 mg of acetaminophen per 24-hour period. This is equivalent to 6 tablets of Darvocet, 8 tablets of Vicodin, or 12 tablets of Norco, Percocet or Tylenol #3. If you take other over-the-counter medications containing acetaminophen, you must take the amount of acetaminophen into account and reduce the number of prescribed pain pills accordingly.    Contact information and follow-up:    Return to the Eye Clinic for a follow-up appointment with your physician as  scheduled. If no appointment has been scheduled, call [phone] for an  appointment with Dr. Barba within 1 to 2 weeks from your date of surgery.      For severe pain, bleeding, or loss of vision, call the Eye Clinic at [phone].    After hours or on weekends and  "holidays, call [phone] and ask to speak with the ophthalmologist on call.    [facility]  Home Care Following Anesthesia  For 24 hours after surgery:  1. Get plenty of rest.  A responsible adult must stay with you for at least 24 hours after you leave the surgery center.  2. Do not drive or use heavy equipment.  If you have weakness or tingling, don't drive or use heavy equipment until this feeling goes away.   3. Do not drink alcohol.   4. Avoid strenuous or risky activities.  Ask for help when climbing stairs.  5. You may feel lightheaded.  IF so, sit for a few minutes before standing.  Have someone help you get up.   6. If you have nausea (feel sick to your stomach): Drink only clear liquids such as apple juice, ginger ale, broth or 7-Up.  Rest may also help.  Be sure to drink enough fluids.  Move to a regular diet as you feel able.   7. You may have a slight fever.  Call the doctor if your fever is over 100 F (37.7 C) (taken under the tongue) or lasts longer than 24 hours.  8. You may have a dry mouth, a sore throat, muscle aches or trouble sleeping. These should go away after 24 hours.  9. Do not make important or legal decisions.        Today you received a Marcaine or bupivacaine block to numb the nerves near your surgery site.  This is a block using local anesthetic or \"numbing\" medication injected around the nerves to anesthetize or \"numb\" the area supplied by those nerves.  This block is injected into the muscle layer near your surgical site.  The medication may numb the location where you had surgery for 6-18 hours, but may last up to 24 hours.  If your surgical site is an arm or leg you should be careful with your affected limb, since it is possible to injure your limb without being aware of it due to the numbing.  Until full feeling returns, you should guard against bumping or hitting your limb, and avoid extreme hot or cold temperatures on the skin.  As the " block wears off, the feeling will return as a tingling or prickly sensation near your surgical site.  You will experience more discomfort from your incision as the feeling returns.  You may want to take a pain pill (a narcotic or Tylenol if this was prescribed by your surgeon) when you start to experience mild pain before the pain beccomes more severe.  If your pain medications do not control your pain you should notifiy your surgeon.    Tips for taking pain medications  To get the best pain relief possible, remember these points:    Take pain medications as directed, before pain becomes severe.    Pain medication can upset your stomach: taking it with food may help.    Constipation is a common side effect of pain medication. Drink plenty of  fluids.    Eat foods high in fiber. Take a stool softener if recommended by your doctor or pharmacist.    Do not drink alcohol, drive or operate machinery while taking pain medications.    Ask about other ways to control pain, such as with heat, ice or relaxation.    Tylenol/Acetaminophen Consumption  To help encourage the safe use of acetaminophen, the makers of TYLENOL  have lowered the maximum daily dose for single-ingredient Extra Strength TYLENOL  (acetaminophen) products sold in the [place] from 8 pills per day (4,000 mg) to 6 pills per day (3,000 mg). The dosing interval has also changed from 2 pills every 4-6 hours to 2 pills every 6 hours.    If you feel your pain relief is insufficient, you may take Tylenol/Acetaminophen in addition to your narcotic pain medication.     Be careful not to exceed 3,000 mg of Tylenol/Acetaminophen in a 24 hour period from all sources.    If you are taking extra strength Tylenol/acetaminophen (500 mg), the maximum dose is 6 tablets in 24 hours.    If you are taking regular strength acetaminophen (325 mg), the maximum dose is 9 tablets in 24 hours.    **975 mg Tylenol given at 9:10, can take again at 3:10 PM    Call a doctor for any of the  followin. Signs of infection (fever, growing tenderness at the surgery site, a large amount of drainage or bleeding, severe pain, foul-smelling drainage, redness, swelling).  2. It has been over 8 to 10 hours since surgery and you are still not able to urinate (pass water).  3. Headache for over 24 hours.  Your doctor is:  Dr. Leo Barba, Ophthalmology: [phone]                 Or dial [phone] and ask for the resident on call for:  Ophthalmology  For emergency care, call the:  [facility]:  [phone] (TTY for hearing impaired: [phone])

## 2019-08-21 NOTE — ANESTHESIA CARE TRANSFER NOTE
Patient: Yuko Pickett    Procedure(s):  Right Upper Lid Ptosis Repair    Diagnosis: Ptosis  Diagnosis Additional Information: No value filed.    Anesthesia Type:   MAC     Note:  Airway :Room Air  Patient transferred to:Phase II  Comments: VSS/WNL. Responds well.Handoff Report: Identifed the Patient, Identified the Reponsible Provider, Reviewed the pertinent medical history, Discussed the surgical course, Reviewed Intra-OP anesthesia mangement and issues during anesthesia, Set expectations for post-procedure period and Allowed opportunity for questions and acknowledgement of understanding      Vitals: (Last set prior to Anesthesia Care Transfer)    CRNA VITALS  8/21/2019 0932 - 8/21/2019 1007      8/21/2019             Pulse:  89    SpO2:  96 %    Resp Rate (set):  10                Electronically Signed By: TIMMY Carvajal CRNA  August 21, 2019  10:07 AM

## 2019-08-21 NOTE — ANESTHESIA PREPROCEDURE EVALUATION
Anesthesia Pre-Procedure Evaluation    Patient: Yuko Pickett   MRN:     [id number] Gender:   female   Age:    29 year old :      1990        Preoperative Diagnosis: Ptosis   Procedure(s):  Right Upper Lid Ptosis Repair     Past Medical History:   Diagnosis Date     Anxiety      Depression      Uncomplicated asthma       Past Surgical History:   Procedure Laterality Date     REPAIR PTOSIS Right 2017    Procedure: REPAIR PTOSIS;  Right Upper Lid Ptosis Repair;  Surgeon: Leo Barba MD;  Location: [facility] OR     REPAIR PTOSIS Left 3/14/2018    Procedure: REPAIR PTOSIS;  Left Upper Eyelid Ptosis Repair ;  Surgeon: Leo Barba MD;  Location:  OR          Anesthesia Evaluation     . Pt has had prior anesthetic. Type: General    No history of anesthetic complications          ROS/MED HX    ENT/Pulmonary:     (+)Intermittent asthma , . .    Neurologic:  - neg neurologic ROS     Cardiovascular:  - neg cardiovascular ROS       METS/Exercise Tolerance:  4 - Raking leaves, gardening   Hematologic:  - neg hematologic  ROS       Musculoskeletal:  - neg musculoskeletal ROS       GI/Hepatic:  - neg GI/hepatic ROS       Renal/Genitourinary:  - ROS Renal section negative       Endo:  - neg endo ROS       Psychiatric:     (+) psychiatric history anxiety and depression      Infectious Disease:  - neg infectious disease ROS       Malignancy:      - no malignancy   Other:                         PHYSICAL EXAM:   Mental Status/Neuro: A/A/O   Airway: Facies: Feasible  Mallampati: I  Mouth/Opening: Full  TM distance: > 6 cm  Neck ROM: Full   Respiratory: Auscultation: CTAB     Resp. Rate: Normal     Resp. Effort: Normal      CV: Rhythm: Regular  Rate: Age appropriate  Heart: Normal Sounds  Edema: None   Comments:      Dental: Normal Dentition                LABS:  CBC:   Lab Results   Component Value Date    WBC 8.5 2019    WBC 7.0 2015    HGB 13.3 2019    HGB 12.6 2015    HCT 39.0  "05/17/2019    HCT 38.0 01/28/2015     05/17/2019     01/28/2015     BMP:   Lab Results   Component Value Date     05/17/2019     01/28/2015    POTASSIUM 3.8 05/17/2019    POTASSIUM 3.7 01/28/2015    CHLORIDE 109 05/17/2019    CHLORIDE 110 (H) 01/28/2015    CO2 26 05/17/2019    CO2 22 01/28/2015    BUN 10 05/17/2019    BUN 12 01/28/2015    CR 0.73 05/17/2019    CR 0.66 01/28/2015    GLC 92 05/17/2019    GLC 85 01/28/2015     COAGS: No results found for: PTT, INR, FIBR  POC:   Lab Results   Component Value Date    HCG Negative 08/21/2019    HCGS Negative 05/17/2019     OTHER:   Lab Results   Component Value Date    WESLEY 8.8 05/17/2019    ALBUMIN 4.0 05/17/2019    PROTTOTAL 7.4 05/17/2019    ALT 24 05/17/2019    AST 16 05/17/2019    ALKPHOS 97 05/17/2019    BILITOTAL 0.4 05/17/2019    LIPASE 111 05/17/2019        Preop Vitals    BP Readings from Last 3 Encounters:   08/21/19 122/85   05/17/19 117/68   03/14/18 103/59    Pulse Readings from Last 3 Encounters:   05/17/19 72   12/20/17 77   01/28/15 95      Resp Readings from Last 3 Encounters:   08/21/19 16   05/17/19 16   03/14/18 16    SpO2 Readings from Last 3 Encounters:   08/21/19 99%   05/17/19 97%   03/14/18 98%      Temp Readings from Last 1 Encounters:   08/21/19 36.7  C (98.1  F)    Ht Readings from Last 1 Encounters:   08/21/19 1.626 m (5' 4\")      Wt Readings from Last 1 Encounters:   08/21/19 61.2 kg (135 lb)    Estimated body mass index is 23.17 kg/m  as calculated from the following:    Height as of this encounter: 1.626 m (5' 4\").    Weight as of this encounter: 61.2 kg (135 lb).     LDA:  Peripheral IV 08/21/19 Right Hand (Active)   Site Assessment WDL 8/21/2019  9:01 AM   Line Status Infusing 8/21/2019  9:01 AM   Phlebitis Scale 0-->no symptoms 8/21/2019  9:01 AM   Infiltration Scale 0 8/21/2019  9:01 AM   Number of days: 0        Assessment:   ASA SCORE: 2    H&P: History and physical reviewed and following examination; no " interval change.   Smoking Status:  Non-Smoker/Unknown   NPO Status: NPO Appropriate     Plan:   Anes. Type:  MAC   Pre-Medication: None   Induction:  N/a   Airway: Native Airway   Access/Monitoring: PIV   Maintenance: N/a     Postop Plan:   Postop Pain: None  Postop Sedation/Airway: Not planned  Disposition: Outpatient     PONV Management:   Adult Risk Factors: Female, Non-Smoker   Prevention: Ondansetron, Dexamethasone     CONSENT: Direct conversation   Plan and risks discussed with: Patient                      Deepak Sepulveda MD, MD

## 2019-08-21 NOTE — BRIEF OP NOTE
[facility] Brief Operative Note    Pre-operative diagnosis: Ptosis   Post-operative diagnosis Same   Procedure: Procedure(s):  Right Upper Lid Ptosis Repair   Surgeon(s): Surgeon(s) and Role:     * Leo Barba MD - Primary     * Ximena Kerr MD - Fellow - Assisting   Estimated blood loss: 1cc    Specimens: None   Findings: Right upper eyelid ptosis     Ximena Kerr MD  Oculoplastic Surgery Fellow

## 2019-08-22 ENCOUNTER — TELEPHONE (OUTPATIENT)
Dept: OPHTHALMOLOGY | Facility: CLINIC | Age: 29
End: 2019-08-22

## 2019-08-23 NOTE — TELEPHONE ENCOUNTER
Telephone call to Yuko Pickett    Doing well with no pain, good vision, and no bleeding. All questions were answered, she is doing well, and postoperative care was reviewed.  A postop appointment has been scheduled.    Ximena Kerr MD  Oculoplastic Surgery Fellow

## 2019-09-09 ENCOUNTER — OFFICE VISIT (OUTPATIENT)
Dept: OPHTHALMOLOGY | Facility: CLINIC | Age: 29
End: 2019-09-09
Payer: COMMERCIAL

## 2019-09-09 DIAGNOSIS — Z98.890 POSTOPERATIVE EYE STATE: Primary | ICD-10-CM

## 2019-09-09 ASSESSMENT — VISUAL ACUITY
METHOD: SNELLEN - LINEAR
CORRECTION_TYPE: GLASSES
OD_CC+: -2
OS_CC: 20/20
OD_CC: 20/20

## 2019-09-09 ASSESSMENT — TONOMETRY
IOP_METHOD: ICARE
OD_IOP_MMHG: 11
OS_IOP_MMHG: 11

## 2019-09-09 NOTE — PROGRESS NOTES
Yuko Pickett is 2 weeks status post Right upper lid ptosis repair  The incision(s) is healing well.  The lid(s)  is  in excellent position.    I have recommended:  * Continue antibiotic ointment or bland lubricating ointment (eg vaseline or aquaphor) to the incision site BID.  *Massage along the incision BID.  * Warm soaks QID until all edema and ecchymoses resolve  * Return to clinic in 6-8 weeks     Attending Physician Attestation:  I have seen and examined this patient.  I have confirmed and edited as necessary the chief complaint(s), history of present illness, review of systems, relevant history, and examination findings as documented by others.  I have personally reviewed the relevant tests, images, and reports as documented above.  I have confirmed and edited as necessary the assessment and plan and agree with this note.    - Leo Barba MD 9:00 AM 9/9/2019

## 2019-09-09 NOTE — NURSING NOTE
Chief Complaints and History of Present Illnesses   Patient presents with     Post Op (Ophthalmology) Right Eye     POW#2 s/p RUL ptosis repair by Fasanella- Sercat internal tarsectomy     Chief Complaint(s) and History of Present Illness(es)     Post Op (Ophthalmology) Right Eye     Laterality: right eye    Associated symptoms: Negative for eye pain    Comments: POW#2 s/p RUL ptosis repair by Fasanella- Sercat internal tarsectomy              Comments     Patient notes that she feels that the LE is drooping again, noticed a few days after having sx on the RE.     Rosa Purcell September 9, 2019 8:46 AM

## 2019-09-21 ENCOUNTER — NURSE TRIAGE (OUTPATIENT)
Dept: NURSING | Facility: CLINIC | Age: 29
End: 2019-09-21

## 2019-09-21 NOTE — TELEPHONE ENCOUNTER
in [place] and [facility] has her medications. She wanted a several days supply sent to a [place] pharmacy since she's not supposed to be off the medications.  I explained there is no one on call for Lila and they would send her to the local urgent care or ER to have the MD write for a several days supply of medications. She understands.  Jenny Garcia RN-[facility]

## 2019-10-22 ENCOUNTER — TELEPHONE (OUTPATIENT)
Dept: OPHTHALMOLOGY | Facility: CLINIC | Age: 29
End: 2019-10-22

## 2019-11-18 ENCOUNTER — OFFICE VISIT (OUTPATIENT)
Dept: OPHTHALMOLOGY | Facility: CLINIC | Age: 29
End: 2019-11-18
Payer: COMMERCIAL

## 2019-11-18 DIAGNOSIS — Z98.890 POSTOPERATIVE EYE STATE: Primary | ICD-10-CM

## 2019-11-18 ASSESSMENT — EXTERNAL EXAM - RIGHT EYE: OD_EXAM: NORMAL

## 2019-11-18 ASSESSMENT — SLIT LAMP EXAM - LIDS
COMMENTS: NORMAL
COMMENTS: NORMAL

## 2019-11-18 ASSESSMENT — VISUAL ACUITY
CORRECTION_TYPE: GLASSES
METHOD: SNELLEN - LINEAR
OD_CC+: -1
OD_CC: 20/20
OS_CC: 20/20

## 2019-11-18 ASSESSMENT — TONOMETRY
OD_IOP_MMHG: 11
OS_IOP_MMHG: 12
IOP_METHOD: TONOPEN UPGAZE

## 2019-11-18 ASSESSMENT — EXTERNAL EXAM - LEFT EYE: OS_EXAM: NORMAL

## 2019-11-18 ASSESSMENT — LAGOPHTHALMOS
OS_LAGOPHTHALMOS: 0
OD_LAGOPHTHALMOS: 0

## 2019-11-18 ASSESSMENT — MARGIN REFLEX DISTANCE
OS_MRD1: 4
OD_MRD1: 5

## 2019-11-18 NOTE — NURSING NOTE
Chief Complaints and History of Present Illnesses   Patient presents with     Post Op (Ophthalmology) Right Eye     POW#12 s/p RUL ptosis repair by Fasanella - Servat internal tarsectomy     Chief Complaint(s) and History of Present Illness(es)     Post Op (Ophthalmology) Right Eye     Laterality: right eye    Associated symptoms: Negative for dryness, eye pain and tearing    Treatments tried: no treatments    Pain scale: 0/10    Comments: POW#12 s/p RUL ptosis repair by Fasanella - Servat internal tarsectomy              Comments     Patient notes that the RUL is doing okay, no pain, denies itching, burning, discharge. Not using gtts or clive.     Rosa LOW November 18, 2019 9:07 AM

## 2019-11-18 NOTE — PROGRESS NOTES
Chief Complaints and History of Present Illnesses   Patient presents with     Post Op (Ophthalmology) Right Eye     POW#12 s/p RUL ptosis repair by Fasanella - Servat internal tarsectomy     Chief Complaint(s) and History of Present Illness(es)     Post Op (Ophthalmology) Right Eye     In right eye.  Associated symptoms include Negative for dryness, eye pain   and tearing.  Treatments tried include no treatments.  Pain was noted as   0/10. Additional comments: POW#12 s/p RUL ptosis repair by Fasanella -   Servat internal tarsectomy       Comments     Patient notes that the RUL is doing okay, no pain, denies itching,   burning, discharge. Not using gtts or clive.     Rosa Purcell COT November 18, 2019 9:07 AM     -S/p 3 ptosis surgeries - right upper eyelid MMCR 12/2017, left upper eyelid [facility] 3/2018, right upper eyelid Fasanella- Servat internal tarsectomy 8/2019  -Healing well, happy with lid position          Assessment & Plan     Yuko Pickett is a 29 year old female with the following diagnoses:   1. Postoperative eye state       -Doing great, good lid position  -F/u as needed           Ximena Kerr MD  Oculoplastics Fellow    Attending Physician Attestation:  Complete documentation of historical and exam elements from today's encounter can be found in the full encounter summary report (not reduplicated in this progress note).  I personally obtained the chief complaint(s) and history of present illness.  I confirmed and edited as necessary the review of systems, past medical/surgical history, family history, social history, and examination findings as documented by others; and I examined the patient myself.  I personally reviewed the relevant tests, images, and reports as documented above.  I formulated and edited as necessary the assessment and plan and discussed the findings and management plan with the patient and family. I personally reviewed the ophthalmic test(s) associated with this encounter,  agree with the interpretation(s) as documented by the resident/fellow, and have edited the corresponding report(s) as necessary.   -Leo Barba MD

## 2020-02-08 ENCOUNTER — ANESTHESIA (OUTPATIENT)
Dept: SURGERY | Facility: CLINIC | Age: 30
End: 2020-02-08
Payer: COMMERCIAL

## 2020-02-08 ENCOUNTER — APPOINTMENT (OUTPATIENT)
Dept: CT IMAGING | Facility: CLINIC | Age: 30
End: 2020-02-08
Attending: NURSE PRACTITIONER
Payer: COMMERCIAL

## 2020-02-08 ENCOUNTER — APPOINTMENT (OUTPATIENT)
Dept: ULTRASOUND IMAGING | Facility: CLINIC | Age: 30
End: 2020-02-08
Attending: NURSE PRACTITIONER
Payer: COMMERCIAL

## 2020-02-08 ENCOUNTER — HOSPITAL ENCOUNTER (OUTPATIENT)
Facility: CLINIC | Age: 30
Discharge: HOME | End: 2020-02-09
Attending: EMERGENCY MEDICINE | Admitting: OBSTETRICS & GYNECOLOGY
Payer: COMMERCIAL

## 2020-02-08 ENCOUNTER — ANESTHESIA EVENT (OUTPATIENT)
Dept: SURGERY | Facility: CLINIC | Age: 30
End: 2020-02-08
Payer: COMMERCIAL

## 2020-02-08 DIAGNOSIS — N83.519 OVARIAN TORSION: ICD-10-CM

## 2020-02-08 LAB
ABO + RH BLD: NORMAL
ABO + RH BLD: NORMAL
ALBUMIN SERPL-MCNC: 4.2 G/DL (ref 3.4–5)
ALBUMIN UR-MCNC: NEGATIVE MG/DL
ALP SERPL-CCNC: 90 U/L (ref 40–150)
ALT SERPL W P-5'-P-CCNC: 23 U/L (ref 0–50)
ANION GAP SERPL CALCULATED.3IONS-SCNC: 6 MMOL/L (ref 3–14)
APPEARANCE UR: CLEAR
AST SERPL W P-5'-P-CCNC: 15 U/L (ref 0–45)
BASOPHILS # BLD AUTO: 0 10E9/L (ref 0–0.2)
BASOPHILS NFR BLD AUTO: 0 %
BILIRUB SERPL-MCNC: 0.3 MG/DL (ref 0.2–1.3)
BILIRUB UR QL STRIP: NEGATIVE
BLD GP AB SCN SERPL QL: NORMAL
BLOOD BANK CMNT PATIENT-IMP: NORMAL
BUN SERPL-MCNC: 9 MG/DL (ref 7–30)
CALCIUM SERPL-MCNC: 9.6 MG/DL (ref 8.5–10.1)
CHLORIDE SERPL-SCNC: 106 MMOL/L (ref 94–109)
CO2 SERPL-SCNC: 26 MMOL/L (ref 20–32)
COLOR UR AUTO: ABNORMAL
CREAT SERPL-MCNC: 0.64 MG/DL (ref 0.52–1.04)
DIFFERENTIAL METHOD BLD: NORMAL
EOSINOPHIL # BLD AUTO: 0.2 10E9/L (ref 0–0.7)
EOSINOPHIL NFR BLD AUTO: 2.9 %
ERYTHROCYTE [DISTWIDTH] IN BLOOD BY AUTOMATED COUNT: 12.5 % (ref 10–15)
GFR SERPL CREATININE-BSD FRML MDRD: >90 ML/MIN/{1.73_M2}
GLUCOSE SERPL-MCNC: 123 MG/DL (ref 70–99)
GLUCOSE UR STRIP-MCNC: NEGATIVE MG/DL
HCG UR QL: NEGATIVE
HCT VFR BLD AUTO: 41.2 % (ref 35–47)
HGB BLD-MCNC: 14 G/DL (ref 11.7–15.7)
HGB UR QL STRIP: NEGATIVE
IMM GRANULOCYTES # BLD: 0 10E9/L (ref 0–0.4)
IMM GRANULOCYTES NFR BLD: 0.1 %
KETONES UR STRIP-MCNC: 10 MG/DL
LEUKOCYTE ESTERASE UR QL STRIP: NEGATIVE
LIPASE SERPL-CCNC: 81 U/L (ref 73–393)
LYMPHOCYTES # BLD AUTO: 1.7 10E9/L (ref 0.8–5.3)
LYMPHOCYTES NFR BLD AUTO: 24.6 %
MCH RBC QN AUTO: 30.2 PG (ref 26.5–33)
MCHC RBC AUTO-ENTMCNC: 34 G/DL (ref 31.5–36.5)
MCV RBC AUTO: 89 FL (ref 78–100)
MONOCYTES # BLD AUTO: 0.7 10E9/L (ref 0–1.3)
MONOCYTES NFR BLD AUTO: 9.5 %
NEUTROPHILS # BLD AUTO: 4.4 10E9/L (ref 1.6–8.3)
NEUTROPHILS NFR BLD AUTO: 62.9 %
NITRATE UR QL: NEGATIVE
NRBC # BLD AUTO: 0 10*3/UL
NRBC BLD AUTO-RTO: 0 /100
PH UR STRIP: 5.5 PH (ref 5–7)
PLATELET # BLD AUTO: 269 10E9/L (ref 150–450)
POTASSIUM SERPL-SCNC: 3.6 MMOL/L (ref 3.4–5.3)
PROT SERPL-MCNC: 7.4 G/DL (ref 6.8–8.8)
RBC # BLD AUTO: 4.63 10E12/L (ref 3.8–5.2)
RBC #/AREA URNS AUTO: 0 /HPF (ref 0–2)
SODIUM SERPL-SCNC: 138 MMOL/L (ref 133–144)
SOURCE: ABNORMAL
SP GR UR STRIP: 1.01 (ref 1–1.03)
SPECIMEN EXP DATE BLD: NORMAL
SQUAMOUS #/AREA URNS AUTO: <1 /HPF (ref 0–1)
UROBILINOGEN UR STRIP-MCNC: NORMAL MG/DL (ref 0–2)
WBC # BLD AUTO: 6.9 10E9/L (ref 4–11)
WBC #/AREA URNS AUTO: 0 /HPF (ref 0–5)

## 2020-02-08 PROCEDURE — 25000128 H RX IP 250 OP 636: Performed by: NURSE ANESTHETIST, CERTIFIED REGISTERED

## 2020-02-08 PROCEDURE — 86901 BLOOD TYPING SEROLOGIC RH(D): CPT | Performed by: NURSE PRACTITIONER

## 2020-02-08 PROCEDURE — 96361 HYDRATE IV INFUSION ADD-ON: CPT

## 2020-02-08 PROCEDURE — 25000566 ZZH SEVOFLURANE, EA 15 MIN: Performed by: OBSTETRICS & GYNECOLOGY

## 2020-02-08 PROCEDURE — 96374 THER/PROPH/DIAG INJ IV PUSH: CPT

## 2020-02-08 PROCEDURE — 25000128 H RX IP 250 OP 636: Performed by: NURSE PRACTITIONER

## 2020-02-08 PROCEDURE — 86900 BLOOD TYPING SEROLOGIC ABO: CPT | Performed by: NURSE PRACTITIONER

## 2020-02-08 PROCEDURE — 74176 CT ABD & PELVIS W/O CONTRAST: CPT

## 2020-02-08 PROCEDURE — 25800030 ZZH RX IP 258 OP 636: Performed by: NURSE ANESTHETIST, CERTIFIED REGISTERED

## 2020-02-08 PROCEDURE — 96376 TX/PRO/DX INJ SAME DRUG ADON: CPT

## 2020-02-08 PROCEDURE — 99285 EMERGENCY DEPT VISIT HI MDM: CPT | Mod: 25

## 2020-02-08 PROCEDURE — 76830 TRANSVAGINAL US NON-OB: CPT

## 2020-02-08 PROCEDURE — 25000125 ZZHC RX 250: Performed by: OBSTETRICS & GYNECOLOGY

## 2020-02-08 PROCEDURE — 25000125 ZZHC RX 250: Performed by: NURSE ANESTHETIST, CERTIFIED REGISTERED

## 2020-02-08 PROCEDURE — 76770 US EXAM ABDO BACK WALL COMP: CPT

## 2020-02-08 PROCEDURE — 37000008 ZZH ANESTHESIA TECHNICAL FEE, 1ST 30 MIN: Performed by: OBSTETRICS & GYNECOLOGY

## 2020-02-08 PROCEDURE — 37000009 ZZH ANESTHESIA TECHNICAL FEE, EACH ADDTL 15 MIN: Performed by: OBSTETRICS & GYNECOLOGY

## 2020-02-08 PROCEDURE — 71000012 ZZH RECOVERY PHASE 1 LEVEL 1 FIRST HR: Performed by: OBSTETRICS & GYNECOLOGY

## 2020-02-08 PROCEDURE — 27210794 ZZH OR GENERAL SUPPLY STERILE: Performed by: OBSTETRICS & GYNECOLOGY

## 2020-02-08 PROCEDURE — 40000169 ZZH STATISTIC PRE-PROCEDURE ASSESSMENT I: Performed by: OBSTETRICS & GYNECOLOGY

## 2020-02-08 PROCEDURE — 86850 RBC ANTIBODY SCREEN: CPT | Performed by: NURSE PRACTITIONER

## 2020-02-08 PROCEDURE — 85025 COMPLETE CBC W/AUTO DIFF WBC: CPT | Performed by: NURSE PRACTITIONER

## 2020-02-08 PROCEDURE — 81001 URINALYSIS AUTO W/SCOPE: CPT | Performed by: EMERGENCY MEDICINE

## 2020-02-08 PROCEDURE — 80053 COMPREHEN METABOLIC PANEL: CPT | Performed by: NURSE PRACTITIONER

## 2020-02-08 PROCEDURE — 36000058 ZZH SURGERY LEVEL 3 EA 15 ADDTL MIN: Performed by: OBSTETRICS & GYNECOLOGY

## 2020-02-08 PROCEDURE — 81025 URINE PREGNANCY TEST: CPT | Performed by: EMERGENCY MEDICINE

## 2020-02-08 PROCEDURE — 25800030 ZZH RX IP 258 OP 636: Performed by: NURSE PRACTITIONER

## 2020-02-08 PROCEDURE — 25800025 ZZH RX 258: Performed by: OBSTETRICS & GYNECOLOGY

## 2020-02-08 PROCEDURE — 96375 TX/PRO/DX INJ NEW DRUG ADDON: CPT

## 2020-02-08 PROCEDURE — 83690 ASSAY OF LIPASE: CPT | Performed by: NURSE PRACTITIONER

## 2020-02-08 PROCEDURE — 36000056 ZZH SURGERY LEVEL 3 1ST 30 MIN: Performed by: OBSTETRICS & GYNECOLOGY

## 2020-02-08 RX ORDER — HYDROCODONE BITARTRATE AND ACETAMINOPHEN 5; 325 MG/1; MG/1
1-2 TABLET ORAL EVERY 4 HOURS PRN
Qty: 10 TABLET | Refills: 0 | Status: SHIPPED | OUTPATIENT
Start: 2020-02-08 | End: 2020-02-20

## 2020-02-08 RX ORDER — KETOROLAC TROMETHAMINE 30 MG/ML
30 INJECTION, SOLUTION INTRAMUSCULAR; INTRAVENOUS EVERY 6 HOURS PRN
Status: CANCELLED | OUTPATIENT
Start: 2020-02-08 | End: 2020-02-13

## 2020-02-08 RX ORDER — DEXAMETHASONE SODIUM PHOSPHATE 4 MG/ML
INJECTION, SOLUTION INTRA-ARTICULAR; INTRALESIONAL; INTRAMUSCULAR; INTRAVENOUS; SOFT TISSUE PRN
Status: DISCONTINUED | OUTPATIENT
Start: 2020-02-08 | End: 2020-02-09

## 2020-02-08 RX ORDER — HYDROMORPHONE HYDROCHLORIDE 1 MG/ML
0.5 INJECTION, SOLUTION INTRAMUSCULAR; INTRAVENOUS; SUBCUTANEOUS
Status: DISCONTINUED | OUTPATIENT
Start: 2020-02-08 | End: 2020-02-09 | Stop reason: HOSPADM

## 2020-02-08 RX ORDER — KETOROLAC TROMETHAMINE 15 MG/ML
15 INJECTION, SOLUTION INTRAMUSCULAR; INTRAVENOUS ONCE
Status: COMPLETED | OUTPATIENT
Start: 2020-02-08 | End: 2020-02-08

## 2020-02-08 RX ORDER — SODIUM CHLORIDE, SODIUM LACTATE, POTASSIUM CHLORIDE, CALCIUM CHLORIDE 600; 310; 30; 20 MG/100ML; MG/100ML; MG/100ML; MG/100ML
INJECTION, SOLUTION INTRAVENOUS CONTINUOUS PRN
Status: DISCONTINUED | OUTPATIENT
Start: 2020-02-08 | End: 2020-02-09

## 2020-02-08 RX ORDER — HYDROCODONE BITARTRATE AND ACETAMINOPHEN 5; 325 MG/1; MG/1
1 TABLET ORAL EVERY 6 HOURS PRN
Qty: 10 TABLET | Refills: 0 | Status: SHIPPED | OUTPATIENT
Start: 2020-02-08 | End: 2020-02-20

## 2020-02-08 RX ORDER — MEPERIDINE HYDROCHLORIDE 25 MG/ML
12.5 INJECTION INTRAMUSCULAR; INTRAVENOUS; SUBCUTANEOUS
Status: CANCELLED | OUTPATIENT
Start: 2020-02-08

## 2020-02-08 RX ORDER — PROPOFOL 10 MG/ML
INJECTION, EMULSION INTRAVENOUS PRN
Status: DISCONTINUED | OUTPATIENT
Start: 2020-02-08 | End: 2020-02-09

## 2020-02-08 RX ORDER — HYDROMORPHONE HYDROCHLORIDE 1 MG/ML
0.5 INJECTION, SOLUTION INTRAMUSCULAR; INTRAVENOUS; SUBCUTANEOUS
Status: COMPLETED | OUTPATIENT
Start: 2020-02-08 | End: 2020-02-08

## 2020-02-08 RX ORDER — ONDANSETRON 4 MG/1
4 TABLET, ORALLY DISINTEGRATING ORAL EVERY 30 MIN PRN
Status: CANCELLED | OUTPATIENT
Start: 2020-02-08

## 2020-02-08 RX ORDER — ONDANSETRON 2 MG/ML
4 INJECTION INTRAMUSCULAR; INTRAVENOUS EVERY 30 MIN PRN
Status: CANCELLED | OUTPATIENT
Start: 2020-02-08

## 2020-02-08 RX ORDER — ESMOLOL HYDROCHLORIDE 10 MG/ML
INJECTION INTRAVENOUS PRN
Status: DISCONTINUED | OUTPATIENT
Start: 2020-02-08 | End: 2020-02-09

## 2020-02-08 RX ORDER — SODIUM CHLORIDE, SODIUM LACTATE, POTASSIUM CHLORIDE, CALCIUM CHLORIDE 600; 310; 30; 20 MG/100ML; MG/100ML; MG/100ML; MG/100ML
INJECTION, SOLUTION INTRAVENOUS CONTINUOUS
Status: CANCELLED | OUTPATIENT
Start: 2020-02-08

## 2020-02-08 RX ORDER — PROPOFOL 10 MG/ML
INJECTION, EMULSION INTRAVENOUS CONTINUOUS PRN
Status: DISCONTINUED | OUTPATIENT
Start: 2020-02-08 | End: 2020-02-09

## 2020-02-08 RX ORDER — ACETAMINOPHEN 650 MG/1
650 SUPPOSITORY RECTAL EVERY 4 HOURS PRN
Status: CANCELLED | OUTPATIENT
Start: 2020-02-08

## 2020-02-08 RX ORDER — FENTANYL CITRATE 50 UG/ML
INJECTION, SOLUTION INTRAMUSCULAR; INTRAVENOUS PRN
Status: DISCONTINUED | OUTPATIENT
Start: 2020-02-08 | End: 2020-02-09

## 2020-02-08 RX ORDER — ONDANSETRON 2 MG/ML
INJECTION INTRAMUSCULAR; INTRAVENOUS PRN
Status: DISCONTINUED | OUTPATIENT
Start: 2020-02-08 | End: 2020-02-09

## 2020-02-08 RX ORDER — NALOXONE HYDROCHLORIDE 0.4 MG/ML
.1-.4 INJECTION, SOLUTION INTRAMUSCULAR; INTRAVENOUS; SUBCUTANEOUS
Status: CANCELLED | OUTPATIENT
Start: 2020-02-08 | End: 2020-02-09

## 2020-02-08 RX ORDER — KETOROLAC TROMETHAMINE 30 MG/ML
INJECTION, SOLUTION INTRAMUSCULAR; INTRAVENOUS PRN
Status: DISCONTINUED | OUTPATIENT
Start: 2020-02-08 | End: 2020-02-09

## 2020-02-08 RX ORDER — FENTANYL CITRATE 50 UG/ML
25-100 INJECTION, SOLUTION INTRAMUSCULAR; INTRAVENOUS
Status: DISCONTINUED | OUTPATIENT
Start: 2020-02-08 | End: 2020-02-09 | Stop reason: HOSPADM

## 2020-02-08 RX ORDER — EPHEDRINE SULFATE 50 MG/ML
INJECTION, SOLUTION INTRAMUSCULAR; INTRAVENOUS; SUBCUTANEOUS PRN
Status: DISCONTINUED | OUTPATIENT
Start: 2020-02-08 | End: 2020-02-09

## 2020-02-08 RX ORDER — ONDANSETRON 2 MG/ML
4 INJECTION INTRAMUSCULAR; INTRAVENOUS
Status: DISCONTINUED | OUTPATIENT
Start: 2020-02-08 | End: 2020-02-09 | Stop reason: HOSPADM

## 2020-02-08 RX ORDER — DIAZEPAM 10 MG/2ML
2.5 INJECTION, SOLUTION INTRAMUSCULAR; INTRAVENOUS
Status: CANCELLED | OUTPATIENT
Start: 2020-02-08

## 2020-02-08 RX ORDER — HYDROMORPHONE HYDROCHLORIDE 1 MG/ML
.3-.5 INJECTION, SOLUTION INTRAMUSCULAR; INTRAVENOUS; SUBCUTANEOUS EVERY 10 MIN PRN
Status: CANCELLED | OUTPATIENT
Start: 2020-02-08

## 2020-02-08 RX ORDER — FENTANYL CITRATE 50 UG/ML
25-50 INJECTION, SOLUTION INTRAMUSCULAR; INTRAVENOUS
Status: CANCELLED | OUTPATIENT
Start: 2020-02-08

## 2020-02-08 RX ORDER — PHENAZOPYRIDINE HYDROCHLORIDE 100 MG/1
200 TABLET, FILM COATED ORAL ONCE
Status: CANCELLED | OUTPATIENT
Start: 2020-02-08 | End: 2020-02-08

## 2020-02-08 RX ORDER — GLYCOPYRROLATE 0.2 MG/ML
INJECTION, SOLUTION INTRAMUSCULAR; INTRAVENOUS PRN
Status: DISCONTINUED | OUTPATIENT
Start: 2020-02-08 | End: 2020-02-09

## 2020-02-08 RX ORDER — SODIUM CHLORIDE, SODIUM LACTATE, POTASSIUM CHLORIDE, CALCIUM CHLORIDE 600; 310; 30; 20 MG/100ML; MG/100ML; MG/100ML; MG/100ML
INJECTION, SOLUTION INTRAVENOUS CONTINUOUS
Status: DISCONTINUED | OUTPATIENT
Start: 2020-02-08 | End: 2020-02-09 | Stop reason: HOSPADM

## 2020-02-08 RX ORDER — MAGNESIUM HYDROXIDE 1200 MG/15ML
LIQUID ORAL PRN
Status: DISCONTINUED | OUTPATIENT
Start: 2020-02-08 | End: 2020-02-09 | Stop reason: HOSPADM

## 2020-02-08 RX ADMIN — Medication 10 MG: at 23:07

## 2020-02-08 RX ADMIN — PROPOFOL 150 MCG/KG/MIN: 10 INJECTION, EMULSION INTRAVENOUS at 22:57

## 2020-02-08 RX ADMIN — KETOROLAC TROMETHAMINE 15 MG: 15 INJECTION, SOLUTION INTRAMUSCULAR; INTRAVENOUS at 17:19

## 2020-02-08 RX ADMIN — FENTANYL CITRATE 50 MCG: 50 INJECTION, SOLUTION INTRAMUSCULAR; INTRAVENOUS at 22:57

## 2020-02-08 RX ADMIN — MIDAZOLAM 2 MG: 1 INJECTION INTRAMUSCULAR; INTRAVENOUS at 22:46

## 2020-02-08 RX ADMIN — SODIUM CHLORIDE, POTASSIUM CHLORIDE, SODIUM LACTATE AND CALCIUM CHLORIDE: 600; 310; 30; 20 INJECTION, SOLUTION INTRAVENOUS at 23:57

## 2020-02-08 RX ADMIN — FENTANYL CITRATE 50 MCG: 50 INJECTION, SOLUTION INTRAMUSCULAR; INTRAVENOUS at 23:26

## 2020-02-08 RX ADMIN — ESMOLOL HYDROCHLORIDE 5 MG: 10 INJECTION, SOLUTION INTRAVENOUS at 23:32

## 2020-02-08 RX ADMIN — ONDANSETRON 4 MG: 2 INJECTION INTRAMUSCULAR; INTRAVENOUS at 23:54

## 2020-02-08 RX ADMIN — GLYCOPYRROLATE 0.2 MG: 0.2 INJECTION, SOLUTION INTRAMUSCULAR; INTRAVENOUS at 23:12

## 2020-02-08 RX ADMIN — FENTANYL CITRATE 50 MCG: 50 INJECTION, SOLUTION INTRAMUSCULAR; INTRAVENOUS at 23:24

## 2020-02-08 RX ADMIN — GLYCOPYRROLATE 0.2 MG: 0.2 INJECTION, SOLUTION INTRAMUSCULAR; INTRAVENOUS at 23:10

## 2020-02-08 RX ADMIN — HYDROMORPHONE HYDROCHLORIDE 0.5 MG: 1 INJECTION, SOLUTION INTRAMUSCULAR; INTRAVENOUS; SUBCUTANEOUS at 20:18

## 2020-02-08 RX ADMIN — SODIUM CHLORIDE 1000 ML: 9 INJECTION, SOLUTION INTRAVENOUS at 17:16

## 2020-02-08 RX ADMIN — DEXAMETHASONE SODIUM PHOSPHATE 4 MG: 4 INJECTION, SOLUTION INTRA-ARTICULAR; INTRALESIONAL; INTRAMUSCULAR; INTRAVENOUS; SOFT TISSUE at 23:13

## 2020-02-08 RX ADMIN — Medication 10 MG: at 23:09

## 2020-02-08 RX ADMIN — ROCURONIUM BROMIDE 50 MG: 10 INJECTION INTRAVENOUS at 23:15

## 2020-02-08 RX ADMIN — PHENYLEPHRINE HYDROCHLORIDE 100 MCG: 10 INJECTION INTRAVENOUS at 23:07

## 2020-02-08 RX ADMIN — PROPOFOL 200 MG: 10 INJECTION, EMULSION INTRAVENOUS at 22:57

## 2020-02-08 RX ADMIN — SODIUM CHLORIDE, POTASSIUM CHLORIDE, SODIUM LACTATE AND CALCIUM CHLORIDE: 600; 310; 30; 20 INJECTION, SOLUTION INTRAVENOUS at 22:34

## 2020-02-08 RX ADMIN — SUCCINYLCHOLINE CHLORIDE 100 MG: 20 INJECTION, SOLUTION INTRAMUSCULAR; INTRAVENOUS; PARENTERAL at 22:57

## 2020-02-08 RX ADMIN — PHENYLEPHRINE HYDROCHLORIDE 100 MCG: 10 INJECTION INTRAVENOUS at 23:09

## 2020-02-08 RX ADMIN — HYDROMORPHONE HYDROCHLORIDE 0.5 MG: 1 INJECTION, SOLUTION INTRAMUSCULAR; INTRAVENOUS; SUBCUTANEOUS at 17:44

## 2020-02-08 ASSESSMENT — MIFFLIN-ST. JEOR: SCORE: 1332.36

## 2020-02-08 ASSESSMENT — ENCOUNTER SYMPTOMS
SHORTNESS OF BREATH: 0
FLANK PAIN: 1
COUGH: 0
CHILLS: 0
BLOOD IN STOOL: 0
ABDOMINAL PAIN: 1
DYSURIA: 0
VOMITING: 0
NAUSEA: 1
CONSTIPATION: 0
LIGHT-HEADEDNESS: 0
HEADACHES: 0
DIZZINESS: 0
DIARRHEA: 1
FREQUENCY: 0
WEAKNESS: 0
FEVER: 0
DIFFICULTY URINATING: 0
PALPITATIONS: 0

## 2020-02-08 NOTE — ED PROVIDER NOTES
"Emergency Department Attending Supervision Note  2/8/2020  5:12 PM      I evaluated this patient in conjunction with Ting Guy NP      Briefly, the patient presented with left flank pain.  She states she has had intermittent pain for the last several days.  Worse than its been in the past.  She denies hematuria or dysuria.  There are no further aggravating or alleviating factors no further associated symptoms.      On my exam:  Vital signs:  Temp: 98.1  F (36.7  C) Temp src: Oral BP: 104/62 Pulse: 64 Heart Rate: 79 Resp: 20 SpO2: 96 % O2 Device: None (Room air)   Height: 162.6 cm (5' 4\") Weight: 61.2 kg (135 lb)  Estimated body mass index is 23.17 kg/m  as calculated from the following:    Height as of this encounter: 1.626 m (5' 4\").    Weight as of this encounter: 61.2 kg (135 lb).        General: Alert, interactive in mild to moderate distress  Head:  Scalp is atraumatic  Eyes:  The pupils are equal, round, and reactive to light    EOM's intact    No scleral icterus  ENT:      Nose:  The external nose is normal  Ears:  External ears are normal  Mouth/Throat: The oropharynx is normal    Mucus membranes are moist       Neck:  Normal range of motion.      There is no rigidity.    Trachea is in the midline         CV:  Regular rate and rhythm    No murmur   Resp:  Breath sounds are clear bilaterally    Non-labored, no retractions or accessory muscle use      GI:  Abdomen is soft, no distension, left lower quadrant and left CVA tenderness.       MS:  Normal strength in all 4 extremities, No midline cervical, thoracic, or lumbar tenderness  Skin:  Warm and dry, No rash or lesions noted.  Neuro: Strength 5/5 x4.  Sensation intact  In all 4 extremities.        Psych:  Awake. Alert.  Normal affect.      Appropriate interactions.    Results:    US Pelvic Complete w Transvaginal & Abd/Pel Duplex Limited  IMPRESSION:    1. Enlarged left ovary of uncertain etiology.  It was also slightly  enlarged, to a lesser " extent, on the prior study dated 5/17/2019.  Early or intermittent torsion is not excluded in the appropriate  clinical setting. Recommend clinical correlation.  2. Arterial and venous waveforms are documented in the bilateral ovaries.  Reading per radiology.     US Renal Complete  IMPRESSION:  Normal renal ultrasound. No evidence for urinary system  obstruction is identified.  Reading per radiology.     CT Abdomen Pelvis w/o Contrast  IMPRESSION:  1. Enlarged hypodense left ovary or adnexal lesion. Ovarian torsion is  not excluded. Enlargement of the fallopian tube as can be seen with  pelvic inflammatory disease is also possible although this does not  appear to be.  2. Small amount of free fluid in pelvis could be physiologic.  3. No other significant abnormalities are identified. Evaluation of  the solid organs of the abdomen and pelvis is limited due to lack of  IV contrast.  Reading per radiology.     CBC: AWNL (WBC 6.9, HGB 14.0, )   CMP: Glucose 123 (H) o/w WNL (Creatinine 0.64)   Lipase: 81     Routine UA with Microscopic: Urineketon 10 (A) o/w WNL   HCG Qualitative urine: Negative     ED course:    (1732)   I performed an exam on the patient, as documented above. The history is obtained from the patient.     Following presentation history and physical examination were performed, the above work-up was undertaken.  Ultrasound demonstrated concern with an enlarged ovary however she does have blood flow there was also concern for ureterolithiasis therefore CT imaging was undertaken returning is unremarkable other than the enlarged ovary, given the ongoing pain in the size of the ovary Dr. Gilliland OB/GYN was consulted.  Given the ongoing symptoms clinical picture and imaging the patient will be taken to the operating room for laparoscopy and possible intervention.  Patient was in agreement this plan of action.      Diagnosis    ICD-10-CM    1. Ovarian torsion N83.519        Scribe Disclosure:  Jesús GOMEZ  Yeison, am serving as a scribe at 5:13 PM on 2/8/2020 to document services personally performed by Jabari Fleder MD based on my observations and the provider's statements to me.        Jabari Felder MD  02/08/20 [id number]

## 2020-02-08 NOTE — ED PROVIDER NOTES
History     Chief Complaint:  Abdominal pain left side  Flank Pain      HPI   Yuko Pickett is a 29 year old female with a history of anxiety and depression who presents with left sided abdominal pain x 3 days, increased today.  Patient states the pain increased this morning with nausea and diarrhea, admits to ETOH last night.  Took ibuprofen at 1300 without relief.  Denies fever, chills, chest pain, SOB, urinary symptoms, or bloody/dark stools.  Her last menstrual period was 2 weeks ago and she has a Mirena IUD.    PERC Criteria (PERC Negative) are met:    Is the patient older than 49: No  Is the HR >99: No  Room air sats <95%: No  Prior DVT or PE: No  Recent trauma or surgery (last 4 weeks): No  Hemoptysis: No  Exogenous estrogen: No  Clinical signs of DVT (unilateral leg swelling): No    Low risk patients who are PERC negative have a probability of PE of less than 2%.      Allergies:    Allergies   Allergen Reactions     Dulera Shortness Of Breath     Penicillins Rash        Medications:      albuterol (PROAIR HFA, PROVENTIL HFA, VENTOLIN HFA) 108 (90 BASE) MCG/ACT inhaler  ALPRAZolam (XANAX PO)  buPROPion (WELLBUTRIN XL) 150 MG 24 hr tablet  IBUPROFEN PO  lamoTRIgine (LAMICTAL) 150 MG tablet  levonorgestrel (MIRENA) 20 MCG/24HR IUD  loratadine (CLARITIN) 10 MG tablet  pseudoePHEDrine (SUDAFED) 30 MG tablet        Problem List:      There are no active problems to display for this patient.       Past Medical History:      Past Medical History:   Diagnosis Date     Anxiety      Depression      Uncomplicated asthma        Past Surgical History:      Past Surgical History:   Procedure Laterality Date     REPAIR PTOSIS Right 12/20/2017    Procedure: REPAIR PTOSIS;  Right Upper Lid Ptosis Repair;  Surgeon: Leo Barba MD;  Location: [facility] OR     REPAIR PTOSIS Left 3/14/2018    Procedure: REPAIR PTOSIS;  Left Upper Eyelid Ptosis Repair ;  Surgeon: Leo Barba MD;  Location: [facility] OR     REPAIR PTOSIS Right  "8/21/2019    Procedure: Right Upper Lid Ptosis Repair;  Surgeon: Leo Barba MD;  Location: [facility] OR       Family History:      History reviewed. No pertinent family history.    Social History:    Marital Status:  Single [1]  Social History     Tobacco Use     Smoking status: Never Smoker     Smokeless tobacco: Never Used   Substance Use Topics     Alcohol use: Yes     Comment: occasional     Drug use: Yes     Types: Marijuana        Review of Systems   Constitutional: Negative for chills and fever.   Respiratory: Negative for cough and shortness of breath.    Cardiovascular: Negative for chest pain, palpitations and leg swelling.   Gastrointestinal: Positive for abdominal pain, diarrhea and nausea. Negative for blood in stool, constipation and vomiting.   Genitourinary: Positive for flank pain. Negative for difficulty urinating, dysuria, frequency and urgency.   Skin: Negative for rash.   Neurological: Negative for dizziness, weakness, light-headedness and headaches.   All other systems reviewed and are negative.        Physical Exam     Patient Vitals for the past 24 hrs:   BP Temp Temp src Pulse Heart Rate Resp SpO2 Height Weight   02/08/20 2100 104/62 -- -- 64 -- -- 96 % -- --   02/08/20 2030 100/66 -- -- 53 -- -- 97 % -- --   02/08/20 2000 109/73 -- -- 62 -- -- 98 % -- --   02/08/20 1930 98/69 -- -- 57 -- -- 98 % -- --   02/08/20 1900 109/68 -- -- 59 -- -- 97 % -- --   02/08/20 1830 111/82 -- -- -- -- -- -- -- --   02/08/20 1800 -- -- -- -- -- -- 100 % -- --   02/08/20 1755 -- -- -- -- -- 20 -- -- --   02/08/20 1634 116/78 98.1  F (36.7  C) Oral -- 79 20 100 % 1.626 m (5' 4\") 61.2 kg (135 lb)       Physical Exam  General: Resting comfortably on the gurney     Eyes:  Conjunctivae and sclerae are normal    ENT:    The nose is normal    Pinnae are normal    The oropharynx is normal    Neck:  Normal range of motion      CV:  Regular rate and rhythm     No edema  Resp:  Lungs are clear    Non-labored    No " rales    No wheezing   GI:  Abdomen is soft, there is no rigidity    Pain with palpation to LUQ and LLQ    No distension    No rebound tenderness     No CVA tenderness  MS:  Normal muscular tone    No asymmetric leg swelling  Skin:  No rash or acute skin lesions noted  Neuro:   Awake, alert.      Speech is normal and fluent.    Face is symmetric.     Moves all extremities  Psych: Normal affect.  Appropriate interactions.            Emergency Department Course         Imaging:  Radiology findings were communicated with the patient and family who voiced understanding of the findings.    CT Abdomen Pelvis w/o Contrast   Final Result   IMPRESSION:   1. Enlarged hypodense left ovary or adnexal lesion. Ovarian torsion is   not excluded. Enlargement of the fallopian tube as can be seen with   pelvic inflammatory disease is also possible although this does not   appear to be.   2. Small amount of free fluid in pelvis could be physiologic.   3. No other significant abnormalities are identified. Evaluation of   the solid organs of the abdomen and pelvis is limited due to lack of   IV contrast.      I called the findings of the enlarged hypodense left ovary (with   possibility of left ovarian torsion) and small amount of free fluid in   the pelvis to Rosibel Guy on 2/8/2020 at approximately 8:32   PM      ILENE PRESLEY MD      US Renal Complete   Final Result   IMPRESSION:  Normal renal ultrasound. No evidence for urinary system   obstruction is identified.      ILENE PRESLEY MD      US Pelvic Complete w Transvaginal & Abd/Pel Duplex Limited   Final Result   Addendum 1 of 1   ABBI BOWERS TERESO      Accession # [id number]      The original report on this patient was dictated by me.       I called the findings of an enlarged left ovary and difficulty in   excluding ovarian torsion to Rosibel Guy on 2/8/2020 at 7:26   PM.         (Date of Addendum: 2/8/2020 7:27 PM)         ILENE PRESLEY MD      Final           Laboratory:  Laboratory findings were communicated with the patient who voiced understanding of the findings.    Labs Ordered and Resulted from Time of ED Arrival Up to the Time of Departure from the ED   ROUTINE UA WITH MICROSCOPIC - Abnormal; Notable for the following components:       Result Value    Ketones Urine 10 (*)     All other components within normal limits   COMPREHENSIVE METABOLIC PANEL - Abnormal; Notable for the following components:    Glucose 123 (*)     All other components within normal limits   HCG QUALITATIVE URINE   CBC WITH PLATELETS DIFFERENTIAL   LIPASE   ABO/RH TYPE AND SCREEN           Interventions:      Medications   ondansetron (ZOFRAN) injection 4 mg (has no administration in time range)   0.9% sodium chloride BOLUS (0 mLs Intravenous Stopped 2/8/20 1820)   ketorolac (TORADOL) injection 15 mg (15 mg Intravenous Given 2/8/20 1719)   HYDROmorphone (PF) (DILAUDID) injection 0.5 mg (0.5 mg Intravenous Given 2/8/20 1744)   HYDROmorphone (PF) (DILAUDID) injection 0.5 mg (0.5 mg Intravenous Given 2/8/20 2018)       Emergency Department Course:  Past medical records, nursing notes, and vitals reviewed.    I performed an exam of the patient as documented above.   IV was inserted and blood was drawn for laboratory testing, results above.  The patient provided a urine sample here in the emergency department. This was sent for laboratory testing, findings above.  The patient was sent for an US and CT while in the emergency department, results above.     I rechecked the patient and discussed the results of her workup thus far.     Findings and plan explained to the Patient and significant other. Patient will be transferred to OR with Dr. Gilliland from OB. via EMS. Discussed the case with Dr. Gilliland, who will admit the patient to a  bed if needed after surgery.    I personally reviewed the laboratory  and imaging results with the Patient and significant other and answered all related  questions prior to admission.     Impression & Plan         Medical Decision Makin-year-old female presents emergency department for evaluation of left flank abdominal pain.  Patient will be going to the OR for exploratory surgery to better evaluate her left ovary and fallopian tube for possible torsion.  Spoke with Dr. Gilliland OB who will be doing the surgery.    Differential diagnosis may include: endometriosis, ovarian cysts, , dysmenorrhea, polycystic ovarian syndrome, ectopic pregnancy, miscarriage, molar pregnancy.  Ectopic and molar pregnancy unlikely due to negative pregnancy test.  Lab and radiology results listed above.  Patient was given IV Toradol, IV Zofran and IV Dilaudid while in the emergency department.  On repeat evaluation patient states her pain is less with no nausea.  Patient is agreeable to the procedure after speaking with Dr. Gilliland on speaker phone.      Diagnosis:    ICD-10-CM    1. Ovarian torsion N83.519 ABO/Rh type and screen       Disposition:  Admitted to OR.    Discharge Medications:  New Prescriptions    No medications on file         Ting Guy NP  2020    EMERGENCY DEPARTMENT       Ting Paez NP  20 [id number]

## 2020-02-09 VITALS
WEIGHT: 135 LBS | SYSTOLIC BLOOD PRESSURE: 104 MMHG | RESPIRATION RATE: 17 BRPM | HEART RATE: 102 BPM | TEMPERATURE: 97.8 F | BODY MASS INDEX: 23.05 KG/M2 | OXYGEN SATURATION: 99 % | DIASTOLIC BLOOD PRESSURE: 62 MMHG | HEIGHT: 64 IN

## 2020-02-09 PROCEDURE — 25000128 H RX IP 250 OP 636: Performed by: NURSE ANESTHETIST, CERTIFIED REGISTERED

## 2020-02-09 PROCEDURE — 25000125 ZZHC RX 250: Performed by: NURSE ANESTHETIST, CERTIFIED REGISTERED

## 2020-02-09 PROCEDURE — 25000128 H RX IP 250 OP 636: Performed by: ANESTHESIOLOGY

## 2020-02-09 PROCEDURE — 58679 UNLISTED LAPS PX OVIDCT OVRY: CPT | Performed by: OBSTETRICS & GYNECOLOGY

## 2020-02-09 PROCEDURE — 25000132 ZZH RX MED GY IP 250 OP 250 PS 637: Performed by: OBSTETRICS & GYNECOLOGY

## 2020-02-09 RX ORDER — FENTANYL CITRATE 50 UG/ML
50 INJECTION, SOLUTION INTRAMUSCULAR; INTRAVENOUS EVERY 5 MIN PRN
Status: DISCONTINUED | OUTPATIENT
Start: 2020-02-09 | End: 2020-02-09 | Stop reason: HOSPADM

## 2020-02-09 RX ORDER — IBUPROFEN 600 MG/1
600 TABLET, FILM COATED ORAL
Status: DISCONTINUED | OUTPATIENT
Start: 2020-02-09 | End: 2020-02-09 | Stop reason: HOSPADM

## 2020-02-09 RX ORDER — HYDROCODONE BITARTRATE AND ACETAMINOPHEN 5; 325 MG/1; MG/1
1 TABLET ORAL
Status: COMPLETED | OUTPATIENT
Start: 2020-02-09 | End: 2020-02-09

## 2020-02-09 RX ADMIN — FENTANYL CITRATE 100 MCG: 50 INJECTION, SOLUTION INTRAMUSCULAR; INTRAVENOUS at 00:14

## 2020-02-09 RX ADMIN — SUGAMMADEX 120 MG: 100 INJECTION, SOLUTION INTRAVENOUS at 00:00

## 2020-02-09 RX ADMIN — FENTANYL CITRATE 50 MCG: 50 INJECTION, SOLUTION INTRAMUSCULAR; INTRAVENOUS at 00:52

## 2020-02-09 RX ADMIN — FENTANYL CITRATE 50 MCG: 50 INJECTION, SOLUTION INTRAMUSCULAR; INTRAVENOUS at 00:37

## 2020-02-09 RX ADMIN — HYDROCODONE BITARTRATE AND ACETAMINOPHEN 1 TABLET: 5; 325 TABLET ORAL at 00:38

## 2020-02-09 NOTE — DISCHARGE INSTRUCTIONS
Discharge Instructions following Gynecological   Laparoscopy, Hysteroscopy, or Pelviscopy  [facility]    Please return to the clinic in:       1 week;  x     2 weeks;        4 weeks;         6 weeks   Make this appointment after you get home.  Note:   Do not drive a car, drink alcohol, or use machinery for the next 24 hours or while taking narcotic pain medications.  You should wait until you have recovered before making any important decisions.  Diet:    Diet as tolerated.  A bland diet or light diet is advisable the day of surgery or if you have any nausea.  Drink plenty of fluids.  Activity:    May resume normal activity as soon as abdominal pain disappears.  Listen to your body; if it hurts, don t do it.  Most women can return to work after 24 hours.  Avoid douches, tampons, and intercourse for 1-2 weeks.  Bathing/Incision Care:    May shower as desired but avoid tub baths (submerging incision) until incisions are healed.    Band-Aids may be removed at any time.  If present, Steri-Strips (small, white tape) will fall off on their own in 7-10 days.    There is usually a small suture (stitch) in the incision under the skin that will dissolve on its own and will not need to be removed.  Your doctor will tell you if you have any sutures that require removal.  What to Expect:    Pain: You may have cramps, shoulder pain, or a low backache for 24-48 hours.  Your doctor will prescribe or advise which pain medications to take.  Short, frequent walks may help alleviate gas pain/discomfort.    Bleeding or vaginal discharge: You may have some bleeding or discharge for a week or longer.    Fever: You may have a low fever for the first two days.     Nausea: If you have nausea (feel sick to your stomach), stay in bed.  Try drinking small amounts of 7-Up, tea, or soup.    Dizziness/Weakness: You may feel dizzy or weak for a few days.  If so, you should rest often, stand up slowly, and use  caution when climbing stairs.  Notify your surgeon for the following signs and symptoms:    Temperature greater than 100.4 oF    Vaginal bleeding in excess of one pad (sanitary napkin) per hour    Uncontrolled pain    Incisions becoming more swollen, warm, reddened, or painful    Abdomen feels firm or swollen  [facility]   Adult Discharge Orders & Instructions     For 24 hours after surgery    1. Get plenty of rest.  A responsible adult must stay with you for at least 24 hours after you leave the hospital.   2. Do not drive or use heavy equipment.  If you have weakness or tingling, don't drive or use heavy equipment until this feeling goes away.  3. Do not drink alcohol.  4. Avoid strenuous or risky activities.  Ask for help when climbing stairs.   5. You may feel lightheaded.  IF so, sit for a few minutes before standing.  Have someone help you get up.   6. If you have nausea (feel sick to your stomach): Drink only clear liquids such as apple juice, ginger ale, broth or 7-Up.  Rest may also help.  Be sure to drink enough fluids.  Move to a regular diet as you feel able.  7. You may have a slight fever. Call the doctor if your fever is over 100 F (37.7 C) (taken under the tongue) or lasts longer than 24 hours.  8. You may have a dry mouth, a sore throat, muscle aches or trouble sleeping.  These should go away after 24 hours.  9. Do not make important or legal decisions.   Call your doctor for any of the followin.  Signs of infection (fever, growing tenderness at the surgery site, a large amount of drainage or bleeding, severe pain, foul-smelling drainage, redness, swelling).    2. It has been over 8 to 10 hours since surgery and you are still not able to urinate (pass water).    3.  Headache for over 24 hours.    4.  Numbness, tingling or weakness the day after surgery (if you had spinal anesthesia).  To contact a doctor, call _____Dr. Collins  [phone]___________________________________

## 2020-02-09 NOTE — OP NOTE
Yuko Pickett  1990  February 8, 2020    PREOP DX:     1. Acute pelvic pain  2. Possible ovarian torsion, possible hemorrhagic cyst, possible involvement of the fallopian tube      POSTOP DX:  1. Left ovarian torsion times one  2. Fallopian tube on the left also torsed, in opposite direction then ovary  3. Left hemorrhagic cyst    PROCEDURE: Laparoscopy with pelviscopic, detorsion of the ovary and fallopian tube.  Left ovarian hemorrhagic cystotomy.    ANESTHESIA:  GETA    SURGEON:  Amber Gilliland MD        PROCEDURE:      The patient was brought to the operating room where following adequate general anesthesia and placed in the dorsolithotomy position where she was prepped and draped.  Her bladder was emptied of clear urine.    A speculum was placed and the cervix was grasped with a tenaculum.  An acorn uterine manipulator placed.    Attention was directed to the patients abdomen where an infraumbilical incision was made.  The veress was placed and passed the sterile drop test.  The abdomen was insufflated following an opening pressure of 5.  The abdomen was filled with 2.5 liters of CO2.  The veress was removed and the step trocar was placed.  Entry was confirmed by direct visualization.    Two accessory ports of 5mm were placed in the right and left lower quadrants under direct visualization.    Attention was directed to the pelvis.  The left adnexa was torsed.  The ovary was torsed in a clockwise fashion x 1 and fallopian tube in a counter clockwise fashion x1.  There was approximately 50 ml of blood in the pelvis from the hemorrhagic cyst on the left rupturing.  This was in the posterior cul de sac.    The ovary and tube were de-torsed and watched during the case.  The fallopian tube initially looked pale and was firm to grasp.  During the case color improved and consistency was normal.    The ovary was visualized, area of rupture seen.  It was opened more with cautery and blood drained.  The  tissue was extremely friable.  Decision was made to not proceed with cystectomy due to concern for bleeding and removal of the ovary.  The bleeding was controlled with cautery and surgiseal powder.  Following copious irrigation the operative field was hemostatic. There were no complications. The CO2 gas was allow to escape.  The accessory ports were removed under direct visualization.  The laparoscope and its sheath were removed under direct visualization.  The ports were closed with 4-0 monocryl and dermabond.  The instruments were removed from the patient's vagina.  The cervix was hemostatic.  Strings of the IUD seen at the end of the surgery.    All sponge, needle, instrument counts were correct.  The estimated blood loss was 70 cc.  The patient was awakened and removed to the recovery room in stable condition.

## 2020-02-09 NOTE — OR NURSING
Discharge instructions, meds and follow up reviewed with pt and . Questions answered. Discharged to home.

## 2020-02-09 NOTE — ANESTHESIA POSTPROCEDURE EVALUATION
Patient: Yuko Pickett    Procedure(s):  OPERATIVE LAPAROSCOPY, DETORSION OF LEFT TUBE AND OVARY X1 SEPARATELY, CYSTOTOMY    Diagnosis:Ovarian torsion [N83.519]  Diagnosis Additional Information: No value filed.    Anesthesia Type:  General, ETT, RSI    Note:  Anesthesia Post Evaluation    Patient location during evaluation: PACU  Patient participation: Able to fully participate in evaluation  Level of consciousness: awake  Pain management: adequate  Airway patency: patent  Cardiovascular status: acceptable  Respiratory status: acceptable  Hydration status: acceptable  PONV: controlled     Anesthetic complications: None          Last vitals:  Vitals:    02/09/20 0050 02/09/20 0120 02/09/20 0130   BP: 112/76  104/62   Pulse: 102     Resp: 25 18 17   Temp:      SpO2: 100%  99%         Electronically Signed By: Jeremy Boggs MD  February 9, 2020  7:06 AM

## 2020-02-09 NOTE — ANESTHESIA PREPROCEDURE EVALUATION
Anesthesia Pre-Procedure Evaluation    Patient: Yuko Pickett   MRN: [id number] : 1990          Preoperative Diagnosis: Ovarian torsion [N83.519]    Procedure(s):  LAPAROSCOPY FOR OVARIAN TORSION    Past Medical History:   Diagnosis Date     Anxiety      Depression      Uncomplicated asthma      Past Surgical History:   Procedure Laterality Date     REPAIR PTOSIS Right 2017    Procedure: REPAIR PTOSIS;  Right Upper Lid Ptosis Repair;  Surgeon: Leo Barba MD;  Location: [facility]     REPAIR PTOSIS Left 3/14/2018    Procedure: REPAIR PTOSIS;  Left Upper Eyelid Ptosis Repair ;  Surgeon: Leo Barba MD;  Location: [facility]     REPAIR PTOSIS Right 2019    Procedure: Right Upper Lid Ptosis Repair;  Surgeon: Leo Barba MD;  Location: [facility]       Anesthesia Evaluation     . Pt has had prior anesthetic.     No history of anesthetic complications          ROS/MED HX    ENT/Pulmonary:     (+)asthma , . .   (-) sleep apnea   Neurologic:       Cardiovascular:         METS/Exercise Tolerance:     Hematologic:         Musculoskeletal:         GI/Hepatic:        (-) GERD   Renal/Genitourinary:     (+) Other Renal/ Genitourinary, left ovarian possible torsion      Endo:         Psychiatric:     (+) psychiatric history anxiety and depression      Infectious Disease:         Malignancy:         Other:                          Physical Exam  Normal systems: cardiovascular, pulmonary and dental    Airway   Mallampati: II  TM distance: >3 FB  Neck ROM: full    Dental     Cardiovascular       Pulmonary             Lab Results   Component Value Date    WBC 6.9 2020    HGB 14.0 2020    HCT 41.2 2020     2020     2020    POTASSIUM 3.6 2020    CHLORIDE 106 2020    CO2 26 2020    BUN 9 2020    CR 0.64 2020     (H) 2020    WESLEY 9.6 2020    ALBUMIN 4.2 2020    PROTTOTAL 7.4 2020    ALT 23 2020    AST 15  "02/08/2020    ALKPHOS 90 02/08/2020    BILITOTAL 0.3 02/08/2020    LIPASE 81 02/08/2020    HCG Negative 02/08/2020    HCGS Negative 05/17/2019       Preop Vitals  BP Readings from Last 3 Encounters:   02/08/20 104/62   08/21/19 105/67   05/17/19 117/68    Pulse Readings from Last 3 Encounters:   02/08/20 64   05/17/19 72   12/20/17 77      Resp Readings from Last 3 Encounters:   02/08/20 20   08/21/19 16   05/17/19 16    SpO2 Readings from Last 3 Encounters:   02/08/20 96%   08/21/19 98%   05/17/19 97%      Temp Readings from Last 1 Encounters:   02/08/20 36.7  C (98.1  F) (Oral)    Ht Readings from Last 1 Encounters:   02/08/20 1.626 m (5' 4\")      Wt Readings from Last 1 Encounters:   02/08/20 61.2 kg (135 lb)    Estimated body mass index is 23.17 kg/m  as calculated from the following:    Height as of this encounter: 1.626 m (5' 4\").    Weight as of this encounter: 61.2 kg (135 lb).       Anesthesia Plan      History & Physical Review  History and physical reviewed and following examination; no interval change.    ASA Status:  2 emergent.    NPO Status:  > 8 hours    Plan for General, ETT and RSI with Intravenous induction. Maintenance will be Balanced.    PONV prophylaxis:  Ondansetron (or other 5HT-3), Dexamethasone or Solumedrol and Other (See comment)  Propofol gtt, zofran, decadron  toradol 30mg IV      Postoperative Care  Postoperative pain management:  IV analgesics and Multi-modal analgesia.      Consents  Anesthetic plan, risks, benefits and alternatives discussed with:  Patient..                 Jeremy Boggs MD  "

## 2020-02-09 NOTE — ANESTHESIA CARE TRANSFER NOTE
Patient: Yuko Pickett    Procedure(s):  OPERATIVE LAPAROSCOPY, DETORSION OF LEFT TUBE AND OVARY X1 SEPARATELY, CYSTOTOMY    Diagnosis: Ovarian torsion [N83.519]  Diagnosis Additional Information: No value filed.    Anesthesia Type:   General, ETT, RSI     Note:  Airway :Face Mask  Patient transferred to:PACU  Comments: Neuromuscular blockade reversed after TOF 4/4, spontaneous respirations, adequate tidal volumes, followed commands to voice, oropharynx suctioned with soft flexible catheter, extubated atraumatically, extubated with suction, airway patent after extubation.  Oxygen via facemask at 6 liters per minute to PACU. Oxygen tubing connected to wall O2 in PACU, SpO2, NiBP, and EKG monitors and alarms on and functioning, Yanique Hugger warmer connected to patient gown, report on patient's clinical status given to PACU RN, RN questions answered. Handoff Report: Identifed the Patient, Identified the Reponsible Provider, Reviewed the pertinent medical history, Discussed the surgical course, Reviewed Intra-OP anesthesia mangement and issues during anesthesia, Set expectations for post-procedure period and Allowed opportunity for questions and acknowledgement of understanding      Vitals: (Last set prior to Anesthesia Care Transfer)    CRNA VITALS  2/8/2020 2340 - 2/9/2020 0016      2/9/2020             NIBP:  126/72    NIBP Mean:  98                Electronically Signed By: TIMMY Cornejo CRNA  February 9, 2020  12:16 AM

## 2020-02-09 NOTE — ED NOTES
Pt still rates pain 7-8/10. Declines pain medication at this time as she wants to wait for results.

## 2020-02-10 ENCOUNTER — TELEPHONE (OUTPATIENT)
Dept: OBGYN | Facility: CLINIC | Age: 30
End: 2020-02-10

## 2020-02-10 NOTE — LETTER
Yuko Pickett, female, 1990  [address]  [facility] [address]          Date of Treatment: 2/8/2020                      To Whom it May Concern:    Yuko Pickett will be out of class until Monday Feb 17th.       Please contact our office with any questions.      Marissa ADAMS  On behalf of Dr. Gilliland

## 2020-02-10 NOTE — TELEPHONE ENCOUNTER
Pt needs a letter stating she will be out of class for one week. Letter emailed to [email or web address].  Marissa Arellano RN on 2/10/2020 at 11:49 AM

## 2020-02-10 NOTE — TELEPHONE ENCOUNTER
Patient called because she needs a doctors note from Dr. Gilliland for her college because she cannot drive on the medication that she is on. Has appointment on 2/18/2020 with Dr. Gilliland but would like a response sooner if possible.

## 2020-02-18 ENCOUNTER — TELEPHONE (OUTPATIENT)
Dept: OBGYN | Facility: CLINIC | Age: 30
End: 2020-02-18

## 2020-02-18 NOTE — TELEPHONE ENCOUNTER
We had to Reschedule patient's appointment from today with  to Thursday , but she has a question. Please call patient today.

## 2020-02-18 NOTE — TELEPHONE ENCOUNTER
Ok to drink a cup a coffee if she wants to. Umbilical incision still sore, reviewed s/s of infection-none at this time. Ibuprofen/tylenol for pain. Call back with any further concerns.   Marissa Arellano RN on 2/18/2020 at 4:15 PM

## 2020-02-20 ENCOUNTER — OFFICE VISIT (OUTPATIENT)
Dept: OBGYN | Facility: CLINIC | Age: 30
End: 2020-02-20
Payer: COMMERCIAL

## 2020-02-20 ENCOUNTER — MYC MEDICAL ADVICE (OUTPATIENT)
Dept: OBGYN | Facility: CLINIC | Age: 30
End: 2020-02-20

## 2020-02-20 VITALS
HEIGHT: 64 IN | WEIGHT: 140 LBS | DIASTOLIC BLOOD PRESSURE: 66 MMHG | SYSTOLIC BLOOD PRESSURE: 114 MMHG | BODY MASS INDEX: 23.9 KG/M2

## 2020-02-20 DIAGNOSIS — L03.316 CELLULITIS OF UMBILICUS: Primary | ICD-10-CM

## 2020-02-20 PROCEDURE — 99024 POSTOP FOLLOW-UP VISIT: CPT | Performed by: OBSTETRICS & GYNECOLOGY

## 2020-02-20 RX ORDER — CEPHALEXIN 500 MG/1
500 CAPSULE ORAL 2 TIMES DAILY
Qty: 14 CAPSULE | Refills: 0 | Status: SHIPPED | OUTPATIENT
Start: 2020-02-20

## 2020-02-20 ASSESSMENT — MIFFLIN-ST. JEOR: SCORE: 1355.04

## 2020-02-20 NOTE — PROGRESS NOTES
SUBJECTIVE:                                                   Yuko Pickett is a 29 year old female who presents to clinic today for the following health issue(s):  Patient presents with:  Surgical Followup: Procedure 2/8/20: OPERATIVE LAPAROSCOPY, DETORSION OF LEFT TUBE AND OVARY X1 SEPARATELY, CYSTOTOMY      HPI:  Patient presenting for follow-up of her surgery.  Patient is doing well with no fevers.  Patient reports that her pain is good.  Patient reports that she does have a little bit of pink near her umbilical incision.  Patient reports that her incisions also are somewhat itchy.    No LMP recorded (lmp unknown). (Menstrual status: IUD)..   Patient is sexually active  Using IUD for contraception.    reports that she has never smoked. She has never used smokeless tobacco.  STD testing offered?  Declined  Health maintenance updated:  yes      Problem list and histories reviewed & adjusted, as indicated.  Additional history: as documented.    There is no problem list on file for this patient.    Past Surgical History:   Procedure Laterality Date     LAPAROSCOPY DIAGNOSTIC (GYN) Left 2/8/2020    Procedure: OPERATIVE LAPAROSCOPY, DETORSION OF LEFT TUBE AND OVARY X1 SEPARATELY, CYSTOTOMY;  Surgeon: Amber De La Rosa MD;  Location: [facility]     REPAIR PTOSIS Right 12/20/2017    Procedure: REPAIR PTOSIS;  Right Upper Lid Ptosis Repair;  Surgeon: Leo Barba MD;  [facility]: [facility]     REPAIR PTOSIS Left 3/14/2018    Procedure: REPAIR PTOSIS;  Left Upper Eyelid Ptosis Repair ;  Surgeon: Leo Barba MD;  Location: [facility]     REPAIR PTOSIS Right 8/21/2019    Procedure: Right Upper Lid Ptosis Repair;  Surgeon: Leo Barba MD;  Location: [facility]      Social History     Tobacco Use     Smoking status: Never Smoker     Smokeless tobacco: Never Used   Substance Use Topics     Alcohol use: Yes     Comment: occasional      Problem (# of Occurrences) Relation (Name,Age of Onset)    Cerebral aneurysm  "(1) Paternal Uncle    Heart Disease (1) Paternal Aunt    Hyperlipidemia (1) Father            Current Outpatient Medications   Medication Sig     albuterol (PROAIR HFA, PROVENTIL HFA, VENTOLIN HFA) 108 (90 BASE) MCG/ACT inhaler Inhale 2 puffs into the lungs every 6 hours     ALPRAZolam (XANAX PO) Take 0.5 mg by mouth once as needed for anxiety     cephALEXin 500 MG PO capsule Take 1 capsule (500 mg) by mouth 2 times daily     cephALEXin 500 MG PO capsule Take 1 capsule (500 mg) by mouth 2 times daily     levonorgestrel (MIRENA) 20 MCG/24HR IUD      loratadine (CLARITIN) 10 MG tablet Take 10 mg by mouth daily     pseudoePHEDrine (SUDAFED) 30 MG tablet      buPROPion (WELLBUTRIN XL) 150 MG 24 hr tablet Take 150 mg by mouth every morning     No current facility-administered medications for this visit.      Allergies   Allergen Reactions     Dulera Shortness Of Breath     Penicillins Rash       ROS:  12 point review of systems negative other than symptoms noted below or in the HPI.  No urinary frequency or dysuria, bladder or kidney problems      OBJECTIVE:     /66   Ht 1.626 m (5' 4\")   Wt 63.5 kg (140 lb)   LMP  (LMP Unknown)   BMI 24.03 kg/m    Body mass index is 24.03 kg/m .    Exam:  Constitutional:  Appearance: Well nourished, well developed alert, in no acute distress  Chest:  Respiratory Effort:  Breathing unlabored.   Cardiovascular: Heart: Auscultation: no edema  Gastrointestinal:  Abdominal Examination:  Small amount of red near umbilical incision.  Rash near her lower quadrant incisions, superior to them, appears around where the glue was placed.  Neurologic:  Mental Status:  Oriented X3.  Normal strength and tone, sensory exam grossly normal, mentation intact and speech normal.    Psychiatric:  Mentation appears normal and affect normal/bright.     In-Clinic Test Results:  No results found for this or any previous visit (from the past 24 hour(s)).    ASSESSMENT/PLAN:                                "                         ICD-10-CM    1. Cellulitis of umbilicus L03.316 cephALEXin 500 MG PO capsule     cephALEXin 500 MG PO capsule       There are no Patient Instructions on file for this visit.    1. Cellulitis- keflex sent.  This is for her umbilical incision.  Discussed with itching using benadryl cream.  Appears to have small rash near her lower incisions.  Discussed precautions and when to call back.    20  minutes was spent face to face with the patient today discussing her history, diagnosis, and follow-up plan as noted above. Over 50% of the visit was spent in counseling and coordination of care.          Amber Gilliland MD  [facility]

## 2020-03-10 ENCOUNTER — HEALTH MAINTENANCE LETTER (OUTPATIENT)
Age: 30
End: 2020-03-10

## 2020-12-20 ENCOUNTER — HEALTH MAINTENANCE LETTER (OUTPATIENT)
Age: 30
End: 2020-12-20

## 2021-04-24 ENCOUNTER — HEALTH MAINTENANCE LETTER (OUTPATIENT)
Age: 31
End: 2021-04-24

## 2021-10-03 ENCOUNTER — HEALTH MAINTENANCE LETTER (OUTPATIENT)
Age: 31
End: 2021-10-03

## 2022-05-15 ENCOUNTER — HEALTH MAINTENANCE LETTER (OUTPATIENT)
Age: 32
End: 2022-05-15

## 2022-09-11 ENCOUNTER — HEALTH MAINTENANCE LETTER (OUTPATIENT)
Age: 32
End: 2022-09-11

## 2023-06-03 ENCOUNTER — HEALTH MAINTENANCE LETTER (OUTPATIENT)
Age: 33
End: 2023-06-03
